# Patient Record
Sex: FEMALE | Race: BLACK OR AFRICAN AMERICAN | NOT HISPANIC OR LATINO | URBAN - METROPOLITAN AREA
[De-identification: names, ages, dates, MRNs, and addresses within clinical notes are randomized per-mention and may not be internally consistent; named-entity substitution may affect disease eponyms.]

---

## 2021-09-16 ENCOUNTER — OUTPATIENT (OUTPATIENT)
Dept: OUTPATIENT SERVICES | Facility: HOSPITAL | Age: 86
LOS: 1 days | End: 2021-09-16
Payer: MEDICARE

## 2021-09-16 ENCOUNTER — APPOINTMENT (OUTPATIENT)
Dept: CT IMAGING | Facility: IMAGING CENTER | Age: 86
End: 2021-09-16
Payer: MEDICARE

## 2021-09-16 DIAGNOSIS — R68.81 EARLY SATIETY: ICD-10-CM

## 2021-09-16 DIAGNOSIS — R63.4 ABNORMAL WEIGHT LOSS: ICD-10-CM

## 2021-09-16 PROCEDURE — 74177 CT ABD & PELVIS W/CONTRAST: CPT | Mod: 26,MH

## 2021-09-16 PROCEDURE — 74177 CT ABD & PELVIS W/CONTRAST: CPT | Mod: MH

## 2021-09-16 PROCEDURE — 82565 ASSAY OF CREATININE: CPT

## 2021-09-25 PROBLEM — Z00.00 ENCOUNTER FOR PREVENTIVE HEALTH EXAMINATION: Status: ACTIVE | Noted: 2021-09-25

## 2021-11-02 ENCOUNTER — OUTPATIENT (OUTPATIENT)
Dept: OUTPATIENT SERVICES | Facility: HOSPITAL | Age: 86
LOS: 1 days | End: 2021-11-02
Payer: MEDICARE

## 2021-11-02 ENCOUNTER — APPOINTMENT (OUTPATIENT)
Dept: CT IMAGING | Facility: IMAGING CENTER | Age: 86
End: 2021-11-02
Payer: MEDICARE

## 2021-11-02 ENCOUNTER — RESULT REVIEW (OUTPATIENT)
Age: 86
End: 2021-11-02

## 2021-11-02 DIAGNOSIS — R68.81 EARLY SATIETY: ICD-10-CM

## 2021-11-02 DIAGNOSIS — Z00.8 ENCOUNTER FOR OTHER GENERAL EXAMINATION: ICD-10-CM

## 2021-11-02 DIAGNOSIS — R63.4 ABNORMAL WEIGHT LOSS: ICD-10-CM

## 2021-11-02 PROCEDURE — 71260 CT THORAX DX C+: CPT | Mod: 26,MH

## 2021-11-02 PROCEDURE — 71260 CT THORAX DX C+: CPT | Mod: MH

## 2021-11-02 PROCEDURE — 82565 ASSAY OF CREATININE: CPT

## 2022-01-04 ENCOUNTER — APPOINTMENT (OUTPATIENT)
Dept: ULTRASOUND IMAGING | Facility: IMAGING CENTER | Age: 87
End: 2022-01-04

## 2023-11-30 ENCOUNTER — INPATIENT (INPATIENT)
Facility: HOSPITAL | Age: 88
LOS: 5 days | Discharge: HOME | End: 2023-12-06
Attending: HOSPITALIST | Admitting: HOSPITALIST
Payer: MEDICARE

## 2023-11-30 VITALS
WEIGHT: 119.93 LBS | HEART RATE: 109 BPM | TEMPERATURE: 103 F | SYSTOLIC BLOOD PRESSURE: 127 MMHG | OXYGEN SATURATION: 96 % | HEIGHT: 64 IN | RESPIRATION RATE: 20 BRPM | DIASTOLIC BLOOD PRESSURE: 76 MMHG

## 2023-11-30 DIAGNOSIS — J45.909 UNSPECIFIED ASTHMA, UNCOMPLICATED: ICD-10-CM

## 2023-11-30 DIAGNOSIS — E87.6 HYPOKALEMIA: ICD-10-CM

## 2023-11-30 DIAGNOSIS — J18.9 PNEUMONIA, UNSPECIFIED ORGANISM: ICD-10-CM

## 2023-11-30 DIAGNOSIS — D64.9 ANEMIA, UNSPECIFIED: ICD-10-CM

## 2023-11-30 DIAGNOSIS — I77.1 STRICTURE OF ARTERY: ICD-10-CM

## 2023-11-30 DIAGNOSIS — E87.20 ACIDOSIS, UNSPECIFIED: ICD-10-CM

## 2023-11-30 DIAGNOSIS — I10 ESSENTIAL (PRIMARY) HYPERTENSION: ICD-10-CM

## 2023-11-30 LAB
ALBUMIN SERPL ELPH-MCNC: 2.5 G/DL — LOW (ref 3.3–5)
ALBUMIN SERPL ELPH-MCNC: 2.5 G/DL — LOW (ref 3.3–5)
ALP SERPL-CCNC: 105 U/L — SIGNIFICANT CHANGE UP (ref 40–120)
ALP SERPL-CCNC: 105 U/L — SIGNIFICANT CHANGE UP (ref 40–120)
ALT FLD-CCNC: 53 U/L — SIGNIFICANT CHANGE UP (ref 12–78)
ALT FLD-CCNC: 53 U/L — SIGNIFICANT CHANGE UP (ref 12–78)
ANION GAP SERPL CALC-SCNC: 9 MMOL/L — SIGNIFICANT CHANGE UP (ref 5–17)
ANION GAP SERPL CALC-SCNC: 9 MMOL/L — SIGNIFICANT CHANGE UP (ref 5–17)
APPEARANCE UR: ABNORMAL
APPEARANCE UR: ABNORMAL
APTT BLD: 28.7 SEC — SIGNIFICANT CHANGE UP (ref 24.5–35.6)
APTT BLD: 28.7 SEC — SIGNIFICANT CHANGE UP (ref 24.5–35.6)
AST SERPL-CCNC: 42 U/L — HIGH (ref 15–37)
AST SERPL-CCNC: 42 U/L — HIGH (ref 15–37)
BACTERIA # UR AUTO: ABNORMAL /HPF
BACTERIA # UR AUTO: ABNORMAL /HPF
BASOPHILS # BLD AUTO: 0 K/UL — SIGNIFICANT CHANGE UP (ref 0–0.2)
BASOPHILS # BLD AUTO: 0 K/UL — SIGNIFICANT CHANGE UP (ref 0–0.2)
BASOPHILS NFR BLD AUTO: 0 % — SIGNIFICANT CHANGE UP (ref 0–2)
BASOPHILS NFR BLD AUTO: 0 % — SIGNIFICANT CHANGE UP (ref 0–2)
BILIRUB SERPL-MCNC: 0.8 MG/DL — SIGNIFICANT CHANGE UP (ref 0.2–1.2)
BILIRUB SERPL-MCNC: 0.8 MG/DL — SIGNIFICANT CHANGE UP (ref 0.2–1.2)
BILIRUB UR-MCNC: ABNORMAL
BILIRUB UR-MCNC: ABNORMAL
BUN SERPL-MCNC: 26 MG/DL — HIGH (ref 7–23)
BUN SERPL-MCNC: 26 MG/DL — HIGH (ref 7–23)
CALCIUM SERPL-MCNC: 8.6 MG/DL — SIGNIFICANT CHANGE UP (ref 8.5–10.1)
CALCIUM SERPL-MCNC: 8.6 MG/DL — SIGNIFICANT CHANGE UP (ref 8.5–10.1)
CHLORIDE SERPL-SCNC: 108 MMOL/L — SIGNIFICANT CHANGE UP (ref 96–108)
CHLORIDE SERPL-SCNC: 108 MMOL/L — SIGNIFICANT CHANGE UP (ref 96–108)
CO2 SERPL-SCNC: 22 MMOL/L — SIGNIFICANT CHANGE UP (ref 22–31)
CO2 SERPL-SCNC: 22 MMOL/L — SIGNIFICANT CHANGE UP (ref 22–31)
COLOR SPEC: SIGNIFICANT CHANGE UP
COLOR SPEC: SIGNIFICANT CHANGE UP
CREAT SERPL-MCNC: 1.24 MG/DL — SIGNIFICANT CHANGE UP (ref 0.5–1.3)
CREAT SERPL-MCNC: 1.24 MG/DL — SIGNIFICANT CHANGE UP (ref 0.5–1.3)
DIFF PNL FLD: NEGATIVE — SIGNIFICANT CHANGE UP
DIFF PNL FLD: NEGATIVE — SIGNIFICANT CHANGE UP
EGFR: 42 ML/MIN/1.73M2 — LOW
EGFR: 42 ML/MIN/1.73M2 — LOW
EOSINOPHIL # BLD AUTO: 0 K/UL — SIGNIFICANT CHANGE UP (ref 0–0.5)
EOSINOPHIL # BLD AUTO: 0 K/UL — SIGNIFICANT CHANGE UP (ref 0–0.5)
EOSINOPHIL NFR BLD AUTO: 0 % — SIGNIFICANT CHANGE UP (ref 0–6)
EOSINOPHIL NFR BLD AUTO: 0 % — SIGNIFICANT CHANGE UP (ref 0–6)
EPI CELLS # UR: SIGNIFICANT CHANGE UP
EPI CELLS # UR: SIGNIFICANT CHANGE UP
GLUCOSE SERPL-MCNC: 149 MG/DL — HIGH (ref 70–99)
GLUCOSE SERPL-MCNC: 149 MG/DL — HIGH (ref 70–99)
GLUCOSE UR QL: NEGATIVE MG/DL — SIGNIFICANT CHANGE UP
GLUCOSE UR QL: NEGATIVE MG/DL — SIGNIFICANT CHANGE UP
HCT VFR BLD CALC: 35.2 % — SIGNIFICANT CHANGE UP (ref 34.5–45)
HCT VFR BLD CALC: 35.2 % — SIGNIFICANT CHANGE UP (ref 34.5–45)
HGB BLD-MCNC: 11.4 G/DL — LOW (ref 11.5–15.5)
HGB BLD-MCNC: 11.4 G/DL — LOW (ref 11.5–15.5)
INR BLD: 1.16 RATIO — SIGNIFICANT CHANGE UP (ref 0.85–1.18)
INR BLD: 1.16 RATIO — SIGNIFICANT CHANGE UP (ref 0.85–1.18)
KETONES UR-MCNC: ABNORMAL MG/DL
KETONES UR-MCNC: ABNORMAL MG/DL
LACTATE SERPL-SCNC: 2 MMOL/L — SIGNIFICANT CHANGE UP (ref 0.7–2)
LACTATE SERPL-SCNC: 2 MMOL/L — SIGNIFICANT CHANGE UP (ref 0.7–2)
LACTATE SERPL-SCNC: 3.3 MMOL/L — HIGH (ref 0.7–2)
LACTATE SERPL-SCNC: 3.3 MMOL/L — HIGH (ref 0.7–2)
LEUKOCYTE ESTERASE UR-ACNC: NEGATIVE — SIGNIFICANT CHANGE UP
LEUKOCYTE ESTERASE UR-ACNC: NEGATIVE — SIGNIFICANT CHANGE UP
LYMPHOCYTES # BLD AUTO: 0.33 K/UL — LOW (ref 1–3.3)
LYMPHOCYTES # BLD AUTO: 0.33 K/UL — LOW (ref 1–3.3)
LYMPHOCYTES # BLD AUTO: 2 % — LOW (ref 13–44)
LYMPHOCYTES # BLD AUTO: 2 % — LOW (ref 13–44)
MANUAL SMEAR VERIFICATION: SIGNIFICANT CHANGE UP
MANUAL SMEAR VERIFICATION: SIGNIFICANT CHANGE UP
MCHC RBC-ENTMCNC: 27.1 PG — SIGNIFICANT CHANGE UP (ref 27–34)
MCHC RBC-ENTMCNC: 27.1 PG — SIGNIFICANT CHANGE UP (ref 27–34)
MCHC RBC-ENTMCNC: 32.4 G/DL — SIGNIFICANT CHANGE UP (ref 32–36)
MCHC RBC-ENTMCNC: 32.4 G/DL — SIGNIFICANT CHANGE UP (ref 32–36)
MCV RBC AUTO: 83.6 FL — SIGNIFICANT CHANGE UP (ref 80–100)
MCV RBC AUTO: 83.6 FL — SIGNIFICANT CHANGE UP (ref 80–100)
MONOCYTES # BLD AUTO: 0.16 K/UL — SIGNIFICANT CHANGE UP (ref 0–0.9)
MONOCYTES # BLD AUTO: 0.16 K/UL — SIGNIFICANT CHANGE UP (ref 0–0.9)
MONOCYTES NFR BLD AUTO: 1 % — LOW (ref 2–14)
MONOCYTES NFR BLD AUTO: 1 % — LOW (ref 2–14)
NEUTROPHILS # BLD AUTO: 15.85 K/UL — HIGH (ref 1.8–7.4)
NEUTROPHILS # BLD AUTO: 15.85 K/UL — HIGH (ref 1.8–7.4)
NEUTROPHILS NFR BLD AUTO: 94 % — HIGH (ref 43–77)
NEUTROPHILS NFR BLD AUTO: 94 % — HIGH (ref 43–77)
NEUTS BAND # BLD: 3 % — SIGNIFICANT CHANGE UP (ref 0–8)
NEUTS BAND # BLD: 3 % — SIGNIFICANT CHANGE UP (ref 0–8)
NITRITE UR-MCNC: NEGATIVE — SIGNIFICANT CHANGE UP
NITRITE UR-MCNC: NEGATIVE — SIGNIFICANT CHANGE UP
NRBC # BLD: 0 /100 — SIGNIFICANT CHANGE UP (ref 0–0)
NRBC # BLD: 0 /100 — SIGNIFICANT CHANGE UP (ref 0–0)
NRBC # BLD: SIGNIFICANT CHANGE UP /100 WBCS (ref 0–0)
NRBC # BLD: SIGNIFICANT CHANGE UP /100 WBCS (ref 0–0)
PH UR: 5.5 — SIGNIFICANT CHANGE UP (ref 5–8)
PH UR: 5.5 — SIGNIFICANT CHANGE UP (ref 5–8)
PLAT MORPH BLD: NORMAL — SIGNIFICANT CHANGE UP
PLAT MORPH BLD: NORMAL — SIGNIFICANT CHANGE UP
PLATELET # BLD AUTO: 202 K/UL — SIGNIFICANT CHANGE UP (ref 150–400)
PLATELET # BLD AUTO: 202 K/UL — SIGNIFICANT CHANGE UP (ref 150–400)
POTASSIUM SERPL-MCNC: 3.4 MMOL/L — LOW (ref 3.5–5.3)
POTASSIUM SERPL-MCNC: 3.4 MMOL/L — LOW (ref 3.5–5.3)
POTASSIUM SERPL-SCNC: 3.4 MMOL/L — LOW (ref 3.5–5.3)
POTASSIUM SERPL-SCNC: 3.4 MMOL/L — LOW (ref 3.5–5.3)
PROT SERPL-MCNC: 7.5 GM/DL — SIGNIFICANT CHANGE UP (ref 6–8.3)
PROT SERPL-MCNC: 7.5 GM/DL — SIGNIFICANT CHANGE UP (ref 6–8.3)
PROT UR-MCNC: 100 MG/DL
PROT UR-MCNC: 100 MG/DL
PROTHROM AB SERPL-ACNC: 13.8 SEC — HIGH (ref 9.5–13)
PROTHROM AB SERPL-ACNC: 13.8 SEC — HIGH (ref 9.5–13)
RAPID RVP RESULT: SIGNIFICANT CHANGE UP
RAPID RVP RESULT: SIGNIFICANT CHANGE UP
RBC # BLD: 4.21 M/UL — SIGNIFICANT CHANGE UP (ref 3.8–5.2)
RBC # BLD: 4.21 M/UL — SIGNIFICANT CHANGE UP (ref 3.8–5.2)
RBC # FLD: 16.3 % — HIGH (ref 10.3–14.5)
RBC # FLD: 16.3 % — HIGH (ref 10.3–14.5)
RBC BLD AUTO: NORMAL — SIGNIFICANT CHANGE UP
RBC BLD AUTO: NORMAL — SIGNIFICANT CHANGE UP
RBC CASTS # UR COMP ASSIST: NEGATIVE /HPF — SIGNIFICANT CHANGE UP (ref 0–4)
RBC CASTS # UR COMP ASSIST: NEGATIVE /HPF — SIGNIFICANT CHANGE UP (ref 0–4)
SARS-COV-2 RNA SPEC QL NAA+PROBE: SIGNIFICANT CHANGE UP
SARS-COV-2 RNA SPEC QL NAA+PROBE: SIGNIFICANT CHANGE UP
SODIUM SERPL-SCNC: 139 MMOL/L — SIGNIFICANT CHANGE UP (ref 135–145)
SODIUM SERPL-SCNC: 139 MMOL/L — SIGNIFICANT CHANGE UP (ref 135–145)
SP GR SPEC: 1.03 — SIGNIFICANT CHANGE UP (ref 1–1.03)
SP GR SPEC: 1.03 — SIGNIFICANT CHANGE UP (ref 1–1.03)
UROBILINOGEN FLD QL: 1 MG/DL — SIGNIFICANT CHANGE UP (ref 0.2–1)
UROBILINOGEN FLD QL: 1 MG/DL — SIGNIFICANT CHANGE UP (ref 0.2–1)
WBC # BLD: 16.34 K/UL — HIGH (ref 3.8–10.5)
WBC # BLD: 16.34 K/UL — HIGH (ref 3.8–10.5)
WBC # FLD AUTO: 16.34 K/UL — HIGH (ref 3.8–10.5)
WBC # FLD AUTO: 16.34 K/UL — HIGH (ref 3.8–10.5)
WBC UR QL: SIGNIFICANT CHANGE UP /HPF (ref 0–5)
WBC UR QL: SIGNIFICANT CHANGE UP /HPF (ref 0–5)

## 2023-11-30 PROCEDURE — 99223 1ST HOSP IP/OBS HIGH 75: CPT

## 2023-11-30 PROCEDURE — 71045 X-RAY EXAM CHEST 1 VIEW: CPT | Mod: 26

## 2023-11-30 PROCEDURE — 99285 EMERGENCY DEPT VISIT HI MDM: CPT

## 2023-11-30 PROCEDURE — 93010 ELECTROCARDIOGRAM REPORT: CPT

## 2023-11-30 RX ORDER — ACETAMINOPHEN 500 MG
1000 TABLET ORAL ONCE
Refills: 0 | Status: COMPLETED | OUTPATIENT
Start: 2023-11-30 | End: 2023-11-30

## 2023-11-30 RX ORDER — CEFTRIAXONE 500 MG/1
1000 INJECTION, POWDER, FOR SOLUTION INTRAMUSCULAR; INTRAVENOUS EVERY 24 HOURS
Refills: 0 | Status: DISCONTINUED | OUTPATIENT
Start: 2023-12-01 | End: 2023-12-02

## 2023-11-30 RX ORDER — BUDESONIDE AND FORMOTEROL FUMARATE DIHYDRATE 160; 4.5 UG/1; UG/1
2 AEROSOL RESPIRATORY (INHALATION)
Refills: 0 | Status: DISCONTINUED | OUTPATIENT
Start: 2023-11-30 | End: 2023-12-06

## 2023-11-30 RX ORDER — POTASSIUM CHLORIDE 20 MEQ
40 PACKET (EA) ORAL ONCE
Refills: 0 | Status: COMPLETED | OUTPATIENT
Start: 2023-11-30 | End: 2023-11-30

## 2023-11-30 RX ORDER — AMLODIPINE BESYLATE 2.5 MG/1
10 TABLET ORAL DAILY
Refills: 0 | Status: DISCONTINUED | OUTPATIENT
Start: 2023-11-30 | End: 2023-12-06

## 2023-11-30 RX ORDER — ACETAMINOPHEN 500 MG
650 TABLET ORAL EVERY 6 HOURS
Refills: 0 | Status: DISCONTINUED | OUTPATIENT
Start: 2023-11-30 | End: 2023-12-06

## 2023-11-30 RX ORDER — AZITHROMYCIN 500 MG/1
500 TABLET, FILM COATED ORAL ONCE
Refills: 0 | Status: COMPLETED | OUTPATIENT
Start: 2023-11-30 | End: 2023-11-30

## 2023-11-30 RX ORDER — CEFTRIAXONE 500 MG/1
1000 INJECTION, POWDER, FOR SOLUTION INTRAMUSCULAR; INTRAVENOUS ONCE
Refills: 0 | Status: COMPLETED | OUTPATIENT
Start: 2023-11-30 | End: 2023-11-30

## 2023-11-30 RX ORDER — SODIUM CHLORIDE 9 MG/ML
1650 INJECTION INTRAMUSCULAR; INTRAVENOUS; SUBCUTANEOUS ONCE
Refills: 0 | Status: COMPLETED | OUTPATIENT
Start: 2023-11-30 | End: 2023-11-30

## 2023-11-30 RX ORDER — ONDANSETRON 8 MG/1
4 TABLET, FILM COATED ORAL EVERY 8 HOURS
Refills: 0 | Status: DISCONTINUED | OUTPATIENT
Start: 2023-11-30 | End: 2023-12-06

## 2023-11-30 RX ORDER — IBUPROFEN 200 MG
600 TABLET ORAL ONCE
Refills: 0 | Status: COMPLETED | OUTPATIENT
Start: 2023-11-30 | End: 2023-11-30

## 2023-11-30 RX ORDER — LANOLIN ALCOHOL/MO/W.PET/CERES
3 CREAM (GRAM) TOPICAL AT BEDTIME
Refills: 0 | Status: DISCONTINUED | OUTPATIENT
Start: 2023-11-30 | End: 2023-12-06

## 2023-11-30 RX ORDER — AZITHROMYCIN 500 MG/1
500 TABLET, FILM COATED ORAL DAILY
Refills: 0 | Status: DISCONTINUED | OUTPATIENT
Start: 2023-12-01 | End: 2023-12-02

## 2023-11-30 RX ADMIN — SODIUM CHLORIDE 825 MILLILITER(S): 9 INJECTION INTRAMUSCULAR; INTRAVENOUS; SUBCUTANEOUS at 12:39

## 2023-11-30 RX ADMIN — Medication 40 MILLIEQUIVALENT(S): at 19:46

## 2023-11-30 RX ADMIN — AZITHROMYCIN 500 MILLIGRAM(S): 500 TABLET, FILM COATED ORAL at 17:46

## 2023-11-30 RX ADMIN — AZITHROMYCIN 255 MILLIGRAM(S): 500 TABLET, FILM COATED ORAL at 16:20

## 2023-11-30 RX ADMIN — CEFTRIAXONE 100 MILLIGRAM(S): 500 INJECTION, POWDER, FOR SOLUTION INTRAMUSCULAR; INTRAVENOUS at 16:22

## 2023-11-30 RX ADMIN — AMLODIPINE BESYLATE 10 MILLIGRAM(S): 2.5 TABLET ORAL at 18:52

## 2023-11-30 RX ADMIN — SODIUM CHLORIDE 1650 MILLILITER(S): 9 INJECTION INTRAMUSCULAR; INTRAVENOUS; SUBCUTANEOUS at 14:25

## 2023-11-30 RX ADMIN — Medication 1000 MILLIGRAM(S): at 13:15

## 2023-11-30 RX ADMIN — Medication 1000 MILLIGRAM(S): at 16:11

## 2023-11-30 RX ADMIN — Medication 100 MILLIGRAM(S): at 20:34

## 2023-11-30 RX ADMIN — Medication 600 MILLIGRAM(S): at 17:19

## 2023-11-30 RX ADMIN — Medication 400 MILLIGRAM(S): at 12:46

## 2023-11-30 RX ADMIN — CEFTRIAXONE 1000 MILLIGRAM(S): 500 INJECTION, POWDER, FOR SOLUTION INTRAMUSCULAR; INTRAVENOUS at 17:46

## 2023-11-30 RX ADMIN — Medication 600 MILLIGRAM(S): at 16:27

## 2023-11-30 NOTE — ED ADULT NURSE NOTE - OBJECTIVE STATEMENT
88 year old female with h/o HTN and lung mass presents today accompanied with her  c/o three days of productive cough, generalized weakness and dyspnea, pt denies sick contacts or recent travel, (-) chest pain (-) nausea or vomiting (-) diarrhea

## 2023-11-30 NOTE — PATIENT PROFILE ADULT - NSPROHMSYMPCOND_GEN_A_NUR
[address] [facility] Adult  Hospitalist Group                                                                                          Hospitalist Progress Note  Rian Gilford, MD  Answering service: [phone] from in house phone        Date of Service:  3/6/2023  NAME:  Socrates Ridley  :  1954  MRN:  [id number]       Admission Summary:   Socrates Ridley is a 76 y.o. female who presents with medical hx of recent fall, hypertension, type II dm, CAD, s/p PCI, CHF, CKD on PD exchange, COPD, GERD was admitted to the hospital on 3/3 with RLE pain and swelling. Pt fell on 3/1, hit her head but no LOC. Pt was able get back up after the fall, was able to move around initially. Pt did not seek for medical after immediately after the fall. Pt decided to seek for medical help when her RLE pain continue to get worse. Interval history / Subjective:   Patient seen and examined earlier this morning by me for follow-up of right lower extremity injury and concern for peritonitis. Patient has no new complaints at the time of my exam.  Knee swelling improved. Still limited range of motion. Assessment & Plan:     RLE pain  ? Cellulitis  S/p fall  - afebrile, wbc normal    BC (3/3) negative so far  Patient's right lower extremity is erythematous and very warm. Hard to differentiate cellulitis from just the hematoma in the injury. Pending MRI.   On empiric vancomycin and ceftriaxone  Orthopedic surgery has seen, no concern for compartment syndrome at this time  Patient found to have torn ACL and meniscus  Outpatient follow-up with orthopedic surgery     ESRD-PD with concern for SBP  - nephrology following;  PD fluid cell count looks like peritonitis  Continue ceftriaxone  Fluid cultures growing diphtheroids  Repeat cell count ordered  ID consult placed     Volume overloaded  - continue with UF with PD    Continue with lasix     Hypertension  - continue with hydralazine, coreg     Type II DM  - A1C 6.4    Continue with insulin therapy  Lantus 15 units daily     Depression  - continue with cympbalta     COPD  - not in exacerbation    On Dulera at home; continue with arformeterol/budesonide neb tmt here    Continue with Singulair    Hypokalemia  On Effer-K    Hypocalcemia  Better when corrected for albumin  We will hold off on IV calcium for now    Anemia, likely of chronic disease  Recheck H&H in the afternoon  Transfuse if hemoglobin less than 7     Code status: Full  Prophylaxis: Heparin  Care Plan discussed with: Patient, nurse  Anticipated Disposition: 48 hours  Inpatient  Cardiac monitoring: Telemetry     Hospital Problems  Date Reviewed: 11/28/2022            Codes Class Noted POA    Acute hypokalemia ICD-10-CM: E87.6  ICD-9-CM: 276.8  3/3/2023 Unknown        Right leg pain ICD-10-CM: M79.604  ICD-9-CM: 729.5  3/3/2023 Unknown        Peritoneal dialysis catheter in place [facility]) ICD-10-CM: Z99.2  ICD-9-CM: V45.11  3/3/2023 Unknown        Right leg swelling ICD-10-CM: M79.89  ICD-9-CM: 729.81  3/3/2023 Unknown         Social Determinants of Health     Tobacco Use: Medium Risk    Smoking Tobacco Use: Former    Smokeless Tobacco Use: Never    Passive Exposure: Not on file   Alcohol Use: Not on file   Financial Resource Strain: Not on file   Food Insecurity: Not on file   Transportation Needs: Not on file   Physical Activity: Not on file   Stress: Not on file   Social Connections: Not on file   Intimate Partner Violence: Not on file   Depression: Not on file   Housing Stability: Not on file       Review of Systems:   A comprehensive review of systems was negative except for that written in the HPI. Vital Signs:    Last 24hrs VS reviewed since prior progress note.  Most recent are:  Visit Vitals  /68   Pulse 82   Temp 98.2 °F (36.8 °C)   Resp 16   Wt 105.9 kg (233 lb 7.5 oz)   SpO2 100%   BMI 38.85 kg/m²         Intake/Output Summary (Last 24 hours) at 3/6/2023 1348  Last data filed at 3/6/2023 0148  Gross per 24 hour   Intake --   Output -963 ml   Net 963 ml          Physical Examination:     I had a face to face encounter with this patient and independently examined them on 3/6/2023 as outlined below:          General : alert x 3, awake, no acute distress,   HEENT: PEERL, EOMI, moist mucus membrane  Neck: supple, no JVD, no meningeal signs  Chest: Clear to auscultation bilaterally   CVS: S1 S2 heard, Capillary refill less than 2 seconds  Abd: soft/ non tender, non distended, BS physiological,   Ext: Right lower extremity at the knee and below is edematous, erythematous, and warm. Raised. Left lower extremity normal range of motion. Nonpitting edema right lower extremity. Left lower extremity no edema  Neuro/Psych: pleasant mood and affect, CN 2-12 grossly intact, sensory grossly within normal limit, Strength 5/5 in all extremities  Skin: warm     Data Review:    Review and/or order of clinical lab test  Review and/or order of tests in the radiology section of CPT  Review and/or order of tests in the medicine section of CPT      I have personally and independently reviewed all pertinent labs, diagnostic studies, imaging, and have provided independent interpretation of the same. Labs:     Recent Labs     03/06/23  0310 03/05/23  1435 03/05/23  0307   WBC 7.4  --  7.6   HGB 7.3* 7.3* 7.0*   HCT 24.0* 24.5* 23.8*     --  352       Recent Labs     03/06/23  0310 03/05/23  0307 03/04/23  0420    140 138   K 3.3* 3.1* 3.7   CL 94* 101 96*   CO2 34* 31 35*   BUN 22* 23* 27*   CREA 4.76* 4.36* 5.03*   * 138* 211*   CA 7.5* 6.2* 7.4*   MG  --  1.9 2.2   PHOS 4.4 4.1 4.2       Recent Labs     03/05/23  0307 03/04/23  0420 03/03/23  1644   ALB 1.3* 1.8* 1.6*       No results for input(s): INR, PTP, APTT, INREXT, INREXT in the last 72 hours.    Recent Labs     03/04/23  0420   FERR 906*        Lab Results   Component Value Date/Time    Folate 5.2 10/19/2022 06:33 PM        No results for input(s): PH, PCO2, PO2 in the last 72 hours. No results for input(s): CPK, CKNDX, TROIQ in the last 72 hours. No lab exists for component: CPKMB  Lab Results   Component Value Date/Time    Cholesterol, total 260 (H) 07/02/2018 04:27 AM    HDL Cholesterol 31 07/02/2018 04:27 AM    LDL, calculated 197.2 (H) 07/02/2018 04:27 AM    Triglyceride 159 (H) 07/02/2018 04:27 AM    CHOL/HDL Ratio 8.4 (H) 07/02/2018 04:27 AM     Lab Results   Component Value Date/Time    Glucose (POC) 129 (H) 03/06/2023 12:01 PM    Glucose (POC) 125 (H) 03/06/2023 07:02 AM    Glucose (POC) 244 (H) 03/05/2023 10:04 PM    Glucose (POC) 179 (H) 03/05/2023 05:22 PM    Glucose (POC) 151 (H) 03/05/2023 12:10 PM     Lab Results   Component Value Date/Time    Color YELLOW/STRAW 12/02/2021 11:00 PM    Appearance CLEAR 12/02/2021 11:00 PM    Specific gravity 1.025 12/02/2021 11:00 PM    Specific gravity 1.010 06/08/2011 12:00 AM    pH (UA) 5.5 12/02/2021 11:00 PM    Protein 300 (A) 12/02/2021 11:00 PM    Glucose 500 (A) 12/02/2021 11:00 PM    Ketone Negative 12/02/2021 11:00 PM    Bilirubin Negative 12/02/2021 11:00 PM    Urobilinogen 1.0 12/02/2021 11:00 PM    Nitrites Negative 12/02/2021 11:00 PM    Leukocyte Esterase Negative 12/02/2021 11:00 PM    Epithelial cells FEW 12/02/2021 11:00 PM    Bacteria Negative 12/02/2021 11:00 PM    WBC 0-4 12/02/2021 11:00 PM    RBC 0-5 12/02/2021 11:00 PM       Notes reviewed from all clinical/nonclinical/nursing services involved in patient's clinical care. Care coordination discussions were held with appropriate clinical/nonclinical/ nursing providers based on care coordination needs. Patients current active Medications were reviewed, considered, added and adjusted based on the clinical condition today. Home Medications were reconciled to the best of my ability given all available resources at the time of admission. Route is PO if not otherwise noted.       Admission Status:[id number]:::1}      Medications Reviewed:     Current Facility-Administered Medications   Medication Dose Route Frequency    piperacillin-tazobactam (ZOSYN) 3.375 g in 0.9% sodium chloride (MBP/ADV) 100 mL MBP  3.375 g IntraVENous Q12H    polyethylene glycol (MIRALAX) packet 17 g  17 g Oral BID    potassium bicarb-citric acid (EFFER-K) tablet 40 mEq  40 mEq Oral BID    peritoneal dialysis DEXTROSE 2.5% (2.5 mEq/L low calcium) solution 2,000 mL  2,000 mL IntraPERitoneal DAILY    gentamicin (GARAMYCIN) 0.1 % cream   Topical BID    senna-docusate (PERICOLACE) 8.6-50 mg per tablet 1 Tablet  1 Tablet Oral DAILY    peritoneal dialysis DEXTROSE 4.25% (2.5 mEq/L low calcium) 6,000 mL with heparin (porcine) 3,000 Units   IntraPERitoneal DAILY    vancomycin - pharmacy to dose    Other Rx Dosing/Monitoring    peritoneal dialysis DEXTROSE 2.5% (2.5 mEq/L low calcium) 6,000 mL with heparin (porcine) 3,000 Units   IntraPERitoneal DAILY    peritoneal dialysis DEXTROSE 2.5% (2.5 mEq/L low calcium) 6,000 mL with heparin (porcine) 3,000 Units   IntraPERitoneal DAILY    epoetin joni-epbx (RETACRIT) injection 20,000 Units  20,000 Units SubCUTAneous Q TUE, THU & SAT    magnesium oxide (MAG-OX) tablet 400 mg  400 mg Oral DAILY    morphine injection 2 mg  2 mg IntraVENous Q3H PRN    oxyCODONE IR (ROXICODONE) tablet 5 mg  5 mg Oral Q6H PRN    albuterol-ipratropium (DUO-NEB) 2.5 MG-0.5 MG/3 ML  3 mL Nebulization Q6H PRN    amLODIPine (NORVASC) tablet 10 mg  10 mg Oral DAILY    aspirin tablet 325 mg  325 mg Oral DAILY    carvediloL (COREG) tablet 25 mg  25 mg Oral BID WITH MEALS    diclofenac (VOLTAREN) 1 % topical gel 2 g  2 g Topical DAILY    DULoxetine (CYMBALTA) capsule 20 mg  20 mg Oral BID    hydrALAZINE (APRESOLINE) tablet 50 mg  50 mg Oral TID    arformoterol 15 mcg/budesonide 0.5 mg neb solution   Nebulization BID RT    montelukast (SINGULAIR) tablet 10 mg  10 mg Oral QHS    rosuvastatin (CRESTOR) tablet 10 mg  10 mg Oral DAILY    ondansetron (ZOFRAN ODT) tablet 4 mg  4 mg Oral Q8H PRN    acetaminophen (TYLENOL) tablet 650 mg  650 mg Oral Q6H PRN    glucose chewable tablet 16 g  4 Tablet Oral PRN    glucagon (GLUCAGEN) injection 1 mg  1 mg IntraMUSCular PRN    dextrose 10 % infusion 0-250 mL  0-250 mL IntraVENous PRN    heparin (porcine) injection 5,000 Units  5,000 Units SubCUTAneous Q12H    furosemide (LASIX) tablet 80 mg  80 mg Oral BID    insulin lispro (HUMALOG) injection   SubCUTAneous AC&HS    insulin glargine (LANTUS) injection 15 Units  15 Units SubCUTAneous DAILY    gentamicin (GARAMYCIN) 0.1 % cream   Topical DIALYSIS PRN     ______________________________________________________________________  EXPECTED LENGTH OF STAY: - - -  ACTUAL LENGTH OF STAY:          3                 Berkley Mckinley MD cardiovascular/respiratory

## 2023-11-30 NOTE — ED ADULT NURSE REASSESSMENT NOTE - NS ED NURSE REASSESS COMMENT FT1
Pt assisted on bedpan for urine specimen. Pt resting comfortably at this time. No s/s of pain noted.  at bedside. Rpt lactate sent and awaiting results.

## 2023-11-30 NOTE — H&P ADULT - NSHPLABSRESULTS_GEN_ALL_CORE
11.4   16.34 )-----------( 202      ( 2023 12:40 )             35.2       139  |  108  |  26<H>  ----------------------------<  149<H>  3.4<L>   |  22  |  1.24    Ca    8.6      2023 12:40    TPro  7.5  /  Alb  2.5<L>  /  TBili  0.8  /  DBili  x   /  AST  42<H>  /  ALT  53  /  AlkPhos  105      Urinalysis Basic - ( 2023 14:20 )    Color: Dark Yellow / Appearance: Cloudy / S.026 / pH: x  Gluc: x / Ketone: Trace mg/dL  / Bili: Small / Urobili: 1.0 mg/dL   Blood: x / Protein: 100 mg/dL / Nitrite: Negative   Leuk Esterase: Negative / RBC: Negative /HPF / WBC 0-2 /HPF   Sq Epi: x / Non Sq Epi: x / Bacteria: Few /HPF    Chest x-ray 23  IMPRESSION:  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 Limited size comparison of the aneurysm due to differing technique and patient position.    Increased reticular and patchy right mid and lower lung opacities, of indeterminate nature, but could be due to infection. A short-term follow-up chest x-ray could be obtained to evaluate for resolution.

## 2023-11-30 NOTE — H&P ADULT - NSHPPHYSICALEXAM_GEN_ALL_CORE
T(C): 36.7 (11-30-23 @ 18:44), Max: 39.5 (11-30-23 @ 14:31)  HR: 92 (11-30-23 @ 18:44) (92 - 118)  BP: 138/68 (11-30-23 @ 18:44) (127/76 - 146/82)  RR: 20 (11-30-23 @ 18:44) (17 - 27)  SpO2: 94% (11-30-23 @ 18:44) (94% - 98%)    CONSTITUTIONAL: Well groomed, no apparent distress  EYES: PERRLA and symmetric, EOMI  ENMT: Oral mucosa with moist membranes  RESP: No respiratory distress, tachypneic, diminished breath sounds on R.  CV: RRR  GI: Soft, NT, ND

## 2023-11-30 NOTE — ED PROVIDER NOTE - CLINICAL SUMMARY MEDICAL DECISION MAKING FREE TEXT BOX
88 year old female with h/o HTN and lung mass presents today accompanied with her  c/o three days of productive cough, generalized weakness and dyspnea, pt denies sick contacts or recent travel, (-) chest pain (-) nausea or vomiting (-) diarrhea, on exam pt is in no respiratory distress, able to speak in complete sentences, on oxygen due to hypoxia, lungs with crackles on the right side, skin is hot to touch otherwise normal, sepsis workup ordered, r/o pna, flu/covid, uti, will reassess and dispo 88 year old female with h/o HTN and lung mass presents today accompanied with her  c/o three days of productive cough, generalized weakness and dyspnea, pt denies sick contacts or recent travel, (-) chest pain (-) nausea or vomiting (-) diarrhea, on exam pt is in no respiratory distress, able to speak in complete sentences, on oxygen due to hypoxia, lungs with crackles on the right side, skin is hot to touch otherwise normal, sepsis workup ordered, r/o pna, flu/covid, uti, will reassess and dispo    labs reviewed, cxr positive for pna  iv antibiotics started, ivf, vitals reassessed  pt tba for treatment

## 2023-11-30 NOTE — ED ADULT NURSE NOTE - NSFALLHARMRISKINTERV_ED_ALL_ED
Assistance OOB with selected safe patient handling equipment if applicable/Assistance with ambulation/Communicate risk of Fall with Harm to all staff, patient, and family/Monitor gait and stability/Provide visual cue: red socks, yellow wristband, yellow gown, etc/Reinforce activity limits and safety measures with patient and family/Bed in lowest position, wheels locked, appropriate side rails in place/Call bell, personal items and telephone in reach/Instruct patient to call for assistance before getting out of bed/chair/stretcher/Non-slip footwear applied when patient is off stretcher/[place] to call system/Physically safe environment - no spills, clutter or unnecessary equipment/Purposeful Proactive Rounding/Room/bathroom lighting operational, light cord in reach

## 2023-11-30 NOTE — H&P ADULT - HISTORY OF PRESENT ILLNESS
Josue Claudio is an 88 year old female with PMHx of HTN and asthma who presented to the ED on 11/30/23 for complaints of shortness of breath and cough.    Patient reports she started having trouble breathing three days ago. Associated productive cough with dark yellow sputum. Took over-the-counter antitussives without improvement of symptoms. She thought it was her asthma flaring up so she took her Symbicort but that did not help either. Denies recent travel or sick contacts. No stand-alone radiators or mold at home. No pets, carpet, or new fragrances. Presented to the ER for further evaluation.    In the ED, Tmax 103.1, HR as elevated as 105, BP as elevated as 146/82, RR as elevated as 25. WBC 16.34K, hgb 11.4, potassium 3.4, BUN 26, lactic acid 3.3. U/A unremarkable. RVP negative. CXR with RML and RLL patchy infiltrates. Received NS 1650 cc bolus, ceftriaxone 1 g IV, azithromycin 500 mg IV, and acetaminophen 1 g IV. Repeat lactic acid 2.0 after IVF.

## 2023-11-30 NOTE — ED ADULT TRIAGE NOTE - CHIEF COMPLAINT QUOTE
BIBEMS c/o shortness of breath, feeling feverish, coughing x 3 days. hx: asthma, htn, tumor found in lung. as per ems 91% on RA, given 3L via NC and went to 96%

## 2023-11-30 NOTE — ED PROVIDER NOTE - HIV OFFER
Patient presents with upper right rib pain from falling outside.  Patient states that she also is having a hard time breathing.   Symptoms started: Monday 20th.  OTCs used: Tylenol    Patient would like communication of their results via:        Cell Phone:   Telephone Information:   Mobile [phone]     Okay to leave a message containing results? Yes      Writer was wearing gloves, level 3 procedure mask and N95 respirator during rooming process.       Opt out

## 2023-11-30 NOTE — PATIENT PROFILE ADULT - FALL HARM RISK - HARM RISK INTERVENTIONS

## 2023-11-30 NOTE — H&P ADULT - ASSESSMENT
Josue Claudio is an 88 year old female with PMHx of HTN and asthma who presented to the ED on 11/30/23 for complaints of shortness of breath and cough and admitted for sepsis secondary to PNA.    Sepsis secondary to PNA  Complaints of shortness of breath and productive cough x 3 days  Temp 103.1, , RR 25, WBC 16.34K on admission  CXR with RML and RLL patchy infiltrates  U/A unremarkable. 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 Plan of care discussed with  and son at bedside.

## 2023-12-01 LAB
FERRITIN SERPL-MCNC: 219 NG/ML — SIGNIFICANT CHANGE UP (ref 13–330)
FERRITIN SERPL-MCNC: 219 NG/ML — SIGNIFICANT CHANGE UP (ref 13–330)
FOLATE SERPL-MCNC: 13.9 NG/ML — SIGNIFICANT CHANGE UP
FOLATE SERPL-MCNC: 13.9 NG/ML — SIGNIFICANT CHANGE UP
HCT VFR BLD CALC: 32.5 % — LOW (ref 34.5–45)
HCT VFR BLD CALC: 32.5 % — LOW (ref 34.5–45)
HGB BLD-MCNC: 10.6 G/DL — LOW (ref 11.5–15.5)
HGB BLD-MCNC: 10.6 G/DL — LOW (ref 11.5–15.5)
IRON SATN MFR SERPL: 10 UG/DL — LOW (ref 30–160)
IRON SATN MFR SERPL: 10 UG/DL — LOW (ref 30–160)
IRON SATN MFR SERPL: 5 % — LOW (ref 14–50)
IRON SATN MFR SERPL: 5 % — LOW (ref 14–50)
MCHC RBC-ENTMCNC: 27.5 PG — SIGNIFICANT CHANGE UP (ref 27–34)
MCHC RBC-ENTMCNC: 27.5 PG — SIGNIFICANT CHANGE UP (ref 27–34)
MCHC RBC-ENTMCNC: 32.6 G/DL — SIGNIFICANT CHANGE UP (ref 32–36)
MCHC RBC-ENTMCNC: 32.6 G/DL — SIGNIFICANT CHANGE UP (ref 32–36)
MCV RBC AUTO: 84.2 FL — SIGNIFICANT CHANGE UP (ref 80–100)
MCV RBC AUTO: 84.2 FL — SIGNIFICANT CHANGE UP (ref 80–100)
NRBC # BLD: 0 /100 WBCS — SIGNIFICANT CHANGE UP (ref 0–0)
NRBC # BLD: 0 /100 WBCS — SIGNIFICANT CHANGE UP (ref 0–0)
PLATELET # BLD AUTO: 178 K/UL — SIGNIFICANT CHANGE UP (ref 150–400)
PLATELET # BLD AUTO: 178 K/UL — SIGNIFICANT CHANGE UP (ref 150–400)
PROCALCITONIN SERPL-MCNC: 6.72 NG/ML — HIGH (ref 0.02–0.1)
PROCALCITONIN SERPL-MCNC: 6.72 NG/ML — HIGH (ref 0.02–0.1)
RBC # BLD: 3.86 M/UL — SIGNIFICANT CHANGE UP (ref 3.8–5.2)
RBC # BLD: 3.86 M/UL — SIGNIFICANT CHANGE UP (ref 3.8–5.2)
RBC # FLD: 16.6 % — HIGH (ref 10.3–14.5)
RBC # FLD: 16.6 % — HIGH (ref 10.3–14.5)
TIBC SERPL-MCNC: 211 UG/DL — LOW (ref 220–430)
TIBC SERPL-MCNC: 211 UG/DL — LOW (ref 220–430)
UIBC SERPL-MCNC: 201 UG/DL — SIGNIFICANT CHANGE UP (ref 110–370)
UIBC SERPL-MCNC: 201 UG/DL — SIGNIFICANT CHANGE UP (ref 110–370)
VIT B12 SERPL-MCNC: >2000 PG/ML — HIGH (ref 232–1245)
VIT B12 SERPL-MCNC: >2000 PG/ML — HIGH (ref 232–1245)
WBC # BLD: 16.41 K/UL — HIGH (ref 3.8–10.5)
WBC # BLD: 16.41 K/UL — HIGH (ref 3.8–10.5)
WBC # FLD AUTO: 16.41 K/UL — HIGH (ref 3.8–10.5)
WBC # FLD AUTO: 16.41 K/UL — HIGH (ref 3.8–10.5)

## 2023-12-01 PROCEDURE — 99232 SBSQ HOSP IP/OBS MODERATE 35: CPT

## 2023-12-01 PROCEDURE — 99497 ADVNCD CARE PLAN 30 MIN: CPT

## 2023-12-01 RX ADMIN — AMLODIPINE BESYLATE 10 MILLIGRAM(S): 2.5 TABLET ORAL at 06:49

## 2023-12-01 RX ADMIN — Medication 100 MILLIGRAM(S): at 06:49

## 2023-12-01 RX ADMIN — AZITHROMYCIN 500 MILLIGRAM(S): 500 TABLET, FILM COATED ORAL at 11:49

## 2023-12-01 RX ADMIN — Medication 100 MILLIGRAM(S): at 22:06

## 2023-12-01 RX ADMIN — CEFTRIAXONE 100 MILLIGRAM(S): 500 INJECTION, POWDER, FOR SOLUTION INTRAMUSCULAR; INTRAVENOUS at 17:32

## 2023-12-01 RX ADMIN — BUDESONIDE AND FORMOTEROL FUMARATE DIHYDRATE 2 PUFF(S): 160; 4.5 AEROSOL RESPIRATORY (INHALATION) at 17:31

## 2023-12-01 RX ADMIN — Medication 100 MILLIGRAM(S): at 13:45

## 2023-12-01 RX ADMIN — BUDESONIDE AND FORMOTEROL FUMARATE DIHYDRATE 2 PUFF(S): 160; 4.5 AEROSOL RESPIRATORY (INHALATION) at 06:54

## 2023-12-01 NOTE — PROGRESS NOTE ADULT - CONVERSATION DETAILS
Discussed about code status, tube feeding, iv antibiotics and HD.   Patient would like to be DNR/DNI   MOLST form signed.

## 2023-12-02 LAB
ALBUMIN SERPL ELPH-MCNC: 1.9 G/DL — LOW (ref 3.3–5)
ALBUMIN SERPL ELPH-MCNC: 1.9 G/DL — LOW (ref 3.3–5)
ALP SERPL-CCNC: 92 U/L — SIGNIFICANT CHANGE UP (ref 40–120)
ALP SERPL-CCNC: 92 U/L — SIGNIFICANT CHANGE UP (ref 40–120)
ALT FLD-CCNC: 28 U/L — SIGNIFICANT CHANGE UP (ref 12–78)
ALT FLD-CCNC: 28 U/L — SIGNIFICANT CHANGE UP (ref 12–78)
ANION GAP SERPL CALC-SCNC: 7 MMOL/L — SIGNIFICANT CHANGE UP (ref 5–17)
ANION GAP SERPL CALC-SCNC: 7 MMOL/L — SIGNIFICANT CHANGE UP (ref 5–17)
AST SERPL-CCNC: 19 U/L — SIGNIFICANT CHANGE UP (ref 15–37)
AST SERPL-CCNC: 19 U/L — SIGNIFICANT CHANGE UP (ref 15–37)
BILIRUB SERPL-MCNC: 0.5 MG/DL — SIGNIFICANT CHANGE UP (ref 0.2–1.2)
BILIRUB SERPL-MCNC: 0.5 MG/DL — SIGNIFICANT CHANGE UP (ref 0.2–1.2)
BUN SERPL-MCNC: 10 MG/DL — SIGNIFICANT CHANGE UP (ref 7–23)
BUN SERPL-MCNC: 10 MG/DL — SIGNIFICANT CHANGE UP (ref 7–23)
CALCIUM SERPL-MCNC: 7.9 MG/DL — LOW (ref 8.5–10.1)
CALCIUM SERPL-MCNC: 7.9 MG/DL — LOW (ref 8.5–10.1)
CHLORIDE SERPL-SCNC: 108 MMOL/L — SIGNIFICANT CHANGE UP (ref 96–108)
CHLORIDE SERPL-SCNC: 108 MMOL/L — SIGNIFICANT CHANGE UP (ref 96–108)
CO2 SERPL-SCNC: 25 MMOL/L — SIGNIFICANT CHANGE UP (ref 22–31)
CO2 SERPL-SCNC: 25 MMOL/L — SIGNIFICANT CHANGE UP (ref 22–31)
CREAT SERPL-MCNC: 0.65 MG/DL — SIGNIFICANT CHANGE UP (ref 0.5–1.3)
CREAT SERPL-MCNC: 0.65 MG/DL — SIGNIFICANT CHANGE UP (ref 0.5–1.3)
CULTURE RESULTS: SIGNIFICANT CHANGE UP
CULTURE RESULTS: SIGNIFICANT CHANGE UP
EGFR: 85 ML/MIN/1.73M2 — SIGNIFICANT CHANGE UP
EGFR: 85 ML/MIN/1.73M2 — SIGNIFICANT CHANGE UP
GLUCOSE SERPL-MCNC: 90 MG/DL — SIGNIFICANT CHANGE UP (ref 70–99)
GLUCOSE SERPL-MCNC: 90 MG/DL — SIGNIFICANT CHANGE UP (ref 70–99)
HCT VFR BLD CALC: 32.1 % — LOW (ref 34.5–45)
HCT VFR BLD CALC: 32.1 % — LOW (ref 34.5–45)
HGB BLD-MCNC: 10.3 G/DL — LOW (ref 11.5–15.5)
HGB BLD-MCNC: 10.3 G/DL — LOW (ref 11.5–15.5)
LEGIONELLA AG UR QL: NEGATIVE — SIGNIFICANT CHANGE UP
LEGIONELLA AG UR QL: NEGATIVE — SIGNIFICANT CHANGE UP
MCHC RBC-ENTMCNC: 26.8 PG — LOW (ref 27–34)
MCHC RBC-ENTMCNC: 26.8 PG — LOW (ref 27–34)
MCHC RBC-ENTMCNC: 32.1 G/DL — SIGNIFICANT CHANGE UP (ref 32–36)
MCHC RBC-ENTMCNC: 32.1 G/DL — SIGNIFICANT CHANGE UP (ref 32–36)
MCV RBC AUTO: 83.4 FL — SIGNIFICANT CHANGE UP (ref 80–100)
MCV RBC AUTO: 83.4 FL — SIGNIFICANT CHANGE UP (ref 80–100)
MRSA PCR RESULT.: SIGNIFICANT CHANGE UP
MRSA PCR RESULT.: SIGNIFICANT CHANGE UP
NRBC # BLD: 0 /100 WBCS — SIGNIFICANT CHANGE UP (ref 0–0)
NRBC # BLD: 0 /100 WBCS — SIGNIFICANT CHANGE UP (ref 0–0)
PLATELET # BLD AUTO: 245 K/UL — SIGNIFICANT CHANGE UP (ref 150–400)
PLATELET # BLD AUTO: 245 K/UL — SIGNIFICANT CHANGE UP (ref 150–400)
POTASSIUM SERPL-MCNC: 3.1 MMOL/L — LOW (ref 3.5–5.3)
POTASSIUM SERPL-MCNC: 3.1 MMOL/L — LOW (ref 3.5–5.3)
POTASSIUM SERPL-SCNC: 3.1 MMOL/L — LOW (ref 3.5–5.3)
POTASSIUM SERPL-SCNC: 3.1 MMOL/L — LOW (ref 3.5–5.3)
PROT SERPL-MCNC: 6.4 GM/DL — SIGNIFICANT CHANGE UP (ref 6–8.3)
PROT SERPL-MCNC: 6.4 GM/DL — SIGNIFICANT CHANGE UP (ref 6–8.3)
RBC # BLD: 3.85 M/UL — SIGNIFICANT CHANGE UP (ref 3.8–5.2)
RBC # BLD: 3.85 M/UL — SIGNIFICANT CHANGE UP (ref 3.8–5.2)
RBC # FLD: 16.4 % — HIGH (ref 10.3–14.5)
RBC # FLD: 16.4 % — HIGH (ref 10.3–14.5)
S AUREUS DNA NOSE QL NAA+PROBE: SIGNIFICANT CHANGE UP
S AUREUS DNA NOSE QL NAA+PROBE: SIGNIFICANT CHANGE UP
S PNEUM AG UR QL: NEGATIVE — SIGNIFICANT CHANGE UP
S PNEUM AG UR QL: NEGATIVE — SIGNIFICANT CHANGE UP
SODIUM SERPL-SCNC: 140 MMOL/L — SIGNIFICANT CHANGE UP (ref 135–145)
SODIUM SERPL-SCNC: 140 MMOL/L — SIGNIFICANT CHANGE UP (ref 135–145)
SPECIMEN SOURCE: SIGNIFICANT CHANGE UP
SPECIMEN SOURCE: SIGNIFICANT CHANGE UP
WBC # BLD: 13.08 K/UL — HIGH (ref 3.8–10.5)
WBC # BLD: 13.08 K/UL — HIGH (ref 3.8–10.5)
WBC # FLD AUTO: 13.08 K/UL — HIGH (ref 3.8–10.5)
WBC # FLD AUTO: 13.08 K/UL — HIGH (ref 3.8–10.5)

## 2023-12-02 PROCEDURE — 99233 SBSQ HOSP IP/OBS HIGH 50: CPT

## 2023-12-02 PROCEDURE — 71275 CT ANGIOGRAPHY CHEST: CPT | Mod: 26

## 2023-12-02 RX ORDER — IPRATROPIUM/ALBUTEROL SULFATE 18-103MCG
3 AEROSOL WITH ADAPTER (GRAM) INHALATION EVERY 6 HOURS
Refills: 0 | Status: DISCONTINUED | OUTPATIENT
Start: 2023-12-02 | End: 2023-12-03

## 2023-12-02 RX ORDER — PIPERACILLIN AND TAZOBACTAM 4; .5 G/20ML; G/20ML
3.38 INJECTION, POWDER, LYOPHILIZED, FOR SOLUTION INTRAVENOUS ONCE
Refills: 0 | Status: COMPLETED | OUTPATIENT
Start: 2023-12-03 | End: 2023-12-03

## 2023-12-02 RX ORDER — PIPERACILLIN AND TAZOBACTAM 4; .5 G/20ML; G/20ML
3.38 INJECTION, POWDER, LYOPHILIZED, FOR SOLUTION INTRAVENOUS ONCE
Refills: 0 | Status: COMPLETED | OUTPATIENT
Start: 2023-12-02 | End: 2023-12-02

## 2023-12-02 RX ORDER — ALBUTEROL 90 UG/1
AEROSOL, METERED ORAL
Refills: 0 | Status: DISCONTINUED | OUTPATIENT
Start: 2023-12-02 | End: 2023-12-02

## 2023-12-02 RX ORDER — PIPERACILLIN AND TAZOBACTAM 4; .5 G/20ML; G/20ML
3.38 INJECTION, POWDER, LYOPHILIZED, FOR SOLUTION INTRAVENOUS EVERY 8 HOURS
Refills: 0 | Status: DISCONTINUED | OUTPATIENT
Start: 2023-12-03 | End: 2023-12-06

## 2023-12-02 RX ORDER — SODIUM CHLORIDE 9 MG/ML
1000 INJECTION INTRAMUSCULAR; INTRAVENOUS; SUBCUTANEOUS
Refills: 0 | Status: DISCONTINUED | OUTPATIENT
Start: 2023-12-02 | End: 2023-12-06

## 2023-12-02 RX ORDER — POTASSIUM CHLORIDE 20 MEQ
40 PACKET (EA) ORAL EVERY 4 HOURS
Refills: 0 | Status: COMPLETED | OUTPATIENT
Start: 2023-12-02 | End: 2023-12-02

## 2023-12-02 RX ADMIN — Medication 3 MILLILITER(S): at 18:27

## 2023-12-02 RX ADMIN — Medication 100 MILLIGRAM(S): at 21:51

## 2023-12-02 RX ADMIN — Medication 40 MILLIEQUIVALENT(S): at 11:44

## 2023-12-02 RX ADMIN — AZITHROMYCIN 500 MILLIGRAM(S): 500 TABLET, FILM COATED ORAL at 11:44

## 2023-12-02 RX ADMIN — Medication 650 MILLIGRAM(S): at 12:10

## 2023-12-02 RX ADMIN — PIPERACILLIN AND TAZOBACTAM 25 GRAM(S): 4; .5 INJECTION, POWDER, LYOPHILIZED, FOR SOLUTION INTRAVENOUS at 19:03

## 2023-12-02 RX ADMIN — Medication 650 MILLIGRAM(S): at 13:10

## 2023-12-02 RX ADMIN — BUDESONIDE AND FORMOTEROL FUMARATE DIHYDRATE 2 PUFF(S): 160; 4.5 AEROSOL RESPIRATORY (INHALATION) at 05:39

## 2023-12-02 RX ADMIN — Medication 100 MILLIGRAM(S): at 05:40

## 2023-12-02 RX ADMIN — PIPERACILLIN AND TAZOBACTAM 200 GRAM(S): 4; .5 INJECTION, POWDER, LYOPHILIZED, FOR SOLUTION INTRAVENOUS at 14:31

## 2023-12-02 RX ADMIN — Medication 40 MILLIEQUIVALENT(S): at 14:31

## 2023-12-02 RX ADMIN — AMLODIPINE BESYLATE 10 MILLIGRAM(S): 2.5 TABLET ORAL at 05:40

## 2023-12-02 RX ADMIN — SODIUM CHLORIDE 50 MILLILITER(S): 9 INJECTION INTRAMUSCULAR; INTRAVENOUS; SUBCUTANEOUS at 14:33

## 2023-12-02 NOTE — DIETITIAN NUTRITION RISK NOTIFICATION - TREATMENT: THE FOLLOWING DIET HAS BEEN RECOMMENDED
Diet, Regular:   Supplement Feeding Modality:  Oral  Ensure Plus High Protein Cans or Servings Per Day:  1       Frequency:  Two Times a day (12-02-23 @ 14:54) [Pending Verification By Attending]  Diet, Regular (11-30-23 @ 17:43) [Active]

## 2023-12-02 NOTE — DIETITIAN INITIAL EVALUATION ADULT - OTHER INFO
Pt seen on medical floor, adm w/ sepsis 2/2 to PNA w/ c/o SOB x 3 days PTA. Pt lives at home w/ her . She does the food shopping/cooking. Denies N/V/D/C/Chewing/Swallowing issues, No food allergies. (+) wt loss reported x 1 year. Pt is consuming < 75% of meals.  Encouraged and discussed High calorie High Protein nutrition therapy. Pt is receptive to a nutritional supplement.

## 2023-12-02 NOTE — DIETITIAN INITIAL EVALUATION ADULT - PERTINENT LABORATORY DATA
12-02    140  |  108  |  10  ----------------------------<  90  3.1<L>   |  25  |  0.65    Ca    7.9<L>      02 Dec 2023 07:50    TPro  6.4  /  Alb  1.9<L>  /  TBili  0.5  /  DBili  x   /  AST  19  /  ALT  28  /  AlkPhos  92  12-02

## 2023-12-02 NOTE — DIETITIAN INITIAL EVALUATION ADULT - NUTRITIONGOAL OUTCOME1
Pt will Meet calorie and protein needs > 75% via meals / supplements to prevent wt loss during Length Of Stay.

## 2023-12-02 NOTE — DIETITIAN INITIAL EVALUATION ADULT - ETIOLOGY
Inadequate energy / protein intake; Increased energy/protein needs related to persistent decreased p.o. intake

## 2023-12-02 NOTE — DIETITIAN INITIAL EVALUATION ADULT - PERTINENT MEDS FT
MEDICATIONS  (STANDING):  albuterol/ipratropium for Nebulization 3 milliLiter(s) Nebulizer every 6 hours  amLODIPine   Tablet 10 milliGRAM(s) Oral daily  benzonatate 100 milliGRAM(s) Oral every 8 hours  budesonide 160 MICROgram(s)/formoterol 4.5 MICROgram(s) Inhaler 2 Puff(s) Inhalation two times a day  piperacillin/tazobactam IVPB.- 3.375 Gram(s) IV Intermittent once  sodium chloride 0.9%. 1000 milliLiter(s) (50 mL/Hr) IV Continuous <Continuous>    MEDICATIONS  (PRN):  acetaminophen     Tablet .. 650 milliGRAM(s) Oral every 6 hours PRN Temp greater or equal to 38C (100.4F), Mild Pain (1 - 3)  aluminum hydroxide/magnesium hydroxide/simethicone Suspension 30 milliLiter(s) Oral every 4 hours PRN Dyspepsia  melatonin 3 milliGRAM(s) Oral at bedtime PRN Insomnia  ondansetron Injectable 4 milliGRAM(s) IV Push every 8 hours PRN Nausea and/or Vomiting

## 2023-12-03 LAB
ANION GAP SERPL CALC-SCNC: 3 MMOL/L — LOW (ref 5–17)
ANION GAP SERPL CALC-SCNC: 3 MMOL/L — LOW (ref 5–17)
BUN SERPL-MCNC: 9 MG/DL — SIGNIFICANT CHANGE UP (ref 7–23)
BUN SERPL-MCNC: 9 MG/DL — SIGNIFICANT CHANGE UP (ref 7–23)
CALCIUM SERPL-MCNC: 8 MG/DL — LOW (ref 8.5–10.1)
CALCIUM SERPL-MCNC: 8 MG/DL — LOW (ref 8.5–10.1)
CHLORIDE SERPL-SCNC: 108 MMOL/L — SIGNIFICANT CHANGE UP (ref 96–108)
CHLORIDE SERPL-SCNC: 108 MMOL/L — SIGNIFICANT CHANGE UP (ref 96–108)
CO2 SERPL-SCNC: 28 MMOL/L — SIGNIFICANT CHANGE UP (ref 22–31)
CO2 SERPL-SCNC: 28 MMOL/L — SIGNIFICANT CHANGE UP (ref 22–31)
CREAT SERPL-MCNC: 0.73 MG/DL — SIGNIFICANT CHANGE UP (ref 0.5–1.3)
CREAT SERPL-MCNC: 0.73 MG/DL — SIGNIFICANT CHANGE UP (ref 0.5–1.3)
EGFR: 79 ML/MIN/1.73M2 — SIGNIFICANT CHANGE UP
EGFR: 79 ML/MIN/1.73M2 — SIGNIFICANT CHANGE UP
GLUCOSE SERPL-MCNC: 107 MG/DL — HIGH (ref 70–99)
GLUCOSE SERPL-MCNC: 107 MG/DL — HIGH (ref 70–99)
HCT VFR BLD CALC: 31.1 % — LOW (ref 34.5–45)
HCT VFR BLD CALC: 31.1 % — LOW (ref 34.5–45)
HGB BLD-MCNC: 9.8 G/DL — LOW (ref 11.5–15.5)
HGB BLD-MCNC: 9.8 G/DL — LOW (ref 11.5–15.5)
MCHC RBC-ENTMCNC: 26.5 PG — LOW (ref 27–34)
MCHC RBC-ENTMCNC: 26.5 PG — LOW (ref 27–34)
MCHC RBC-ENTMCNC: 31.5 G/DL — LOW (ref 32–36)
MCHC RBC-ENTMCNC: 31.5 G/DL — LOW (ref 32–36)
MCV RBC AUTO: 84.1 FL — SIGNIFICANT CHANGE UP (ref 80–100)
MCV RBC AUTO: 84.1 FL — SIGNIFICANT CHANGE UP (ref 80–100)
NRBC # BLD: 0 /100 WBCS — SIGNIFICANT CHANGE UP (ref 0–0)
NRBC # BLD: 0 /100 WBCS — SIGNIFICANT CHANGE UP (ref 0–0)
PLATELET # BLD AUTO: 257 K/UL — SIGNIFICANT CHANGE UP (ref 150–400)
PLATELET # BLD AUTO: 257 K/UL — SIGNIFICANT CHANGE UP (ref 150–400)
POTASSIUM SERPL-MCNC: 3.8 MMOL/L — SIGNIFICANT CHANGE UP (ref 3.5–5.3)
POTASSIUM SERPL-MCNC: 3.8 MMOL/L — SIGNIFICANT CHANGE UP (ref 3.5–5.3)
POTASSIUM SERPL-SCNC: 3.8 MMOL/L — SIGNIFICANT CHANGE UP (ref 3.5–5.3)
POTASSIUM SERPL-SCNC: 3.8 MMOL/L — SIGNIFICANT CHANGE UP (ref 3.5–5.3)
PROCALCITONIN SERPL-MCNC: 2.35 NG/ML — HIGH (ref 0.02–0.1)
PROCALCITONIN SERPL-MCNC: 2.35 NG/ML — HIGH (ref 0.02–0.1)
RBC # BLD: 3.7 M/UL — LOW (ref 3.8–5.2)
RBC # BLD: 3.7 M/UL — LOW (ref 3.8–5.2)
RBC # FLD: 16.5 % — HIGH (ref 10.3–14.5)
RBC # FLD: 16.5 % — HIGH (ref 10.3–14.5)
SODIUM SERPL-SCNC: 139 MMOL/L — SIGNIFICANT CHANGE UP (ref 135–145)
SODIUM SERPL-SCNC: 139 MMOL/L — SIGNIFICANT CHANGE UP (ref 135–145)
WBC # BLD: 8.83 K/UL — SIGNIFICANT CHANGE UP (ref 3.8–10.5)
WBC # BLD: 8.83 K/UL — SIGNIFICANT CHANGE UP (ref 3.8–10.5)
WBC # FLD AUTO: 8.83 K/UL — SIGNIFICANT CHANGE UP (ref 3.8–10.5)
WBC # FLD AUTO: 8.83 K/UL — SIGNIFICANT CHANGE UP (ref 3.8–10.5)

## 2023-12-03 PROCEDURE — 76856 US EXAM PELVIC COMPLETE: CPT | Mod: 26

## 2023-12-03 PROCEDURE — 99233 SBSQ HOSP IP/OBS HIGH 50: CPT

## 2023-12-03 RX ORDER — METOPROLOL TARTRATE 50 MG
12.5 TABLET ORAL DAILY
Refills: 0 | Status: DISCONTINUED | OUTPATIENT
Start: 2023-12-03 | End: 2023-12-06

## 2023-12-03 RX ADMIN — BUDESONIDE AND FORMOTEROL FUMARATE DIHYDRATE 2 PUFF(S): 160; 4.5 AEROSOL RESPIRATORY (INHALATION) at 06:26

## 2023-12-03 RX ADMIN — Medication 100 MILLIGRAM(S): at 21:57

## 2023-12-03 RX ADMIN — Medication 3 MILLILITER(S): at 11:43

## 2023-12-03 RX ADMIN — Medication 3 MILLILITER(S): at 05:20

## 2023-12-03 RX ADMIN — Medication 100 MILLIGRAM(S): at 12:48

## 2023-12-03 RX ADMIN — Medication 3 MILLILITER(S): at 00:27

## 2023-12-03 RX ADMIN — PIPERACILLIN AND TAZOBACTAM 25 GRAM(S): 4; .5 INJECTION, POWDER, LYOPHILIZED, FOR SOLUTION INTRAVENOUS at 12:48

## 2023-12-03 RX ADMIN — SODIUM CHLORIDE 50 MILLILITER(S): 9 INJECTION INTRAMUSCULAR; INTRAVENOUS; SUBCUTANEOUS at 12:47

## 2023-12-03 RX ADMIN — PIPERACILLIN AND TAZOBACTAM 25 GRAM(S): 4; .5 INJECTION, POWDER, LYOPHILIZED, FOR SOLUTION INTRAVENOUS at 21:56

## 2023-12-03 RX ADMIN — PIPERACILLIN AND TAZOBACTAM 25 GRAM(S): 4; .5 INJECTION, POWDER, LYOPHILIZED, FOR SOLUTION INTRAVENOUS at 00:04

## 2023-12-03 RX ADMIN — AMLODIPINE BESYLATE 10 MILLIGRAM(S): 2.5 TABLET ORAL at 06:24

## 2023-12-03 RX ADMIN — PIPERACILLIN AND TAZOBACTAM 25 GRAM(S): 4; .5 INJECTION, POWDER, LYOPHILIZED, FOR SOLUTION INTRAVENOUS at 06:24

## 2023-12-03 RX ADMIN — Medication 100 MILLIGRAM(S): at 06:24

## 2023-12-04 PROCEDURE — 99232 SBSQ HOSP IP/OBS MODERATE 35: CPT

## 2023-12-04 RX ORDER — LACTOBACILLUS ACIDOPHILUS 100MM CELL
1 CAPSULE ORAL DAILY
Refills: 0 | Status: DISCONTINUED | OUTPATIENT
Start: 2023-12-04 | End: 2023-12-06

## 2023-12-04 RX ADMIN — BUDESONIDE AND FORMOTEROL FUMARATE DIHYDRATE 2 PUFF(S): 160; 4.5 AEROSOL RESPIRATORY (INHALATION) at 06:19

## 2023-12-04 RX ADMIN — Medication 100 MILLIGRAM(S): at 22:39

## 2023-12-04 RX ADMIN — SODIUM CHLORIDE 50 MILLILITER(S): 9 INJECTION INTRAMUSCULAR; INTRAVENOUS; SUBCUTANEOUS at 22:44

## 2023-12-04 RX ADMIN — AMLODIPINE BESYLATE 10 MILLIGRAM(S): 2.5 TABLET ORAL at 06:12

## 2023-12-04 RX ADMIN — PIPERACILLIN AND TAZOBACTAM 25 GRAM(S): 4; .5 INJECTION, POWDER, LYOPHILIZED, FOR SOLUTION INTRAVENOUS at 14:22

## 2023-12-04 RX ADMIN — PIPERACILLIN AND TAZOBACTAM 25 GRAM(S): 4; .5 INJECTION, POWDER, LYOPHILIZED, FOR SOLUTION INTRAVENOUS at 22:32

## 2023-12-04 RX ADMIN — BUDESONIDE AND FORMOTEROL FUMARATE DIHYDRATE 2 PUFF(S): 160; 4.5 AEROSOL RESPIRATORY (INHALATION) at 17:55

## 2023-12-04 RX ADMIN — Medication 100 MILLIGRAM(S): at 14:22

## 2023-12-04 RX ADMIN — Medication 650 MILLIGRAM(S): at 20:15

## 2023-12-04 RX ADMIN — Medication 100 MILLIGRAM(S): at 06:11

## 2023-12-04 RX ADMIN — Medication 650 MILLIGRAM(S): at 20:57

## 2023-12-04 RX ADMIN — PIPERACILLIN AND TAZOBACTAM 25 GRAM(S): 4; .5 INJECTION, POWDER, LYOPHILIZED, FOR SOLUTION INTRAVENOUS at 06:19

## 2023-12-04 RX ADMIN — Medication 12.5 MILLIGRAM(S): at 06:13

## 2023-12-04 RX ADMIN — Medication 1 TABLET(S): at 11:19

## 2023-12-04 NOTE — CONSULT NOTE ADULT - SUBJECTIVE AND OBJECTIVE BOX
CHIEF COMPLAINT: SOB    HPI:  88 year old female with PMHx of HTN and asthma who presented to the ED on 11/30/23 for complaints of shortness of breath and cough.    Patient reports she started having trouble breathing three days ago. Associated productive cough with dark yellow sputum. Took over-the-counter antitussives without improvement of symptoms. She thought it was her asthma flaring up so she took her Symbicort but that did not help either. Denies recent travel or sick contacts. No stand-alone radiators or mold at home. No pets, carpet, or new fragrances. Presented to the ER for further evaluation. In the ED pt was febrile, tachycardic, with cxr with pna. Pt started on CAP coverage and admitted to medicine for pneumonia. Pt then continued to have fevers and hypoxemia requiring NC and pt's abx were upgraded to zosyn. CTA without PE but with significant RML/RLL pna with adjacent pleural effusion. Fevers and lekocytosis have since improved. Pulm consulted for further eval.    Subjective: Pt seen and examined at the bedside.      PAST MEDICAL & SURGICAL HISTORY:  HTN       Asthma      Tortuous aorta          FAMILY HISTORY:      SOCIAL HISTORY:  Smoking: __ packs x ___ years  EtOH Use:  Marital Status:  Occupation:  Recent Travel:  Country of Birth:  Advance Directives:    Allergies    No Known Allergies    Intolerances        HOME MEDICATIONS:    REVIEW OF SYSTEMS:  Constitutional:   Eyes:  ENT:  CV:  Resp:  GI:  :  MSK:  Integumentary:  Neurological:  Psychiatric:  Endocrine:  Hematologic/Lymphatic:  Allergic/Immunologic:  [ ] All other systems negative  [ ] Unable to assess ROS because ________    OBJECTIVE:  ICU Vital Signs Last 24 Hrs  T(C): 37.2 (04 Dec 2023 04:53), Max: 37.7 (03 Dec 2023 15:36)  T(F): 98.9 (04 Dec 2023 04:53), Max: 99.9 (03 Dec 2023 15:36)  HR: 115 (04 Dec 2023 04:53) (96 - 132)  BP: 134/82 (04 Dec 2023 04:53) (130/76 - 135/62)  BP(mean): --  ABP: --  ABP(mean): --  RR: 18 (04 Dec 2023 04:53) (16 - 18)  SpO2: 96% (04 Dec 2023 04:53) (94% - 96%)    O2 Parameters below as of 04 Dec 2023 04:53  Patient On (Oxygen Delivery Method): room air              CAPILLARY BLOOD GLUCOSE          PHYSICAL EXAM:  General:   HEENT:   Lymph Nodes:  Neck:   Respiratory:   Cardiovascular:   Abdomen:   Extremities:   Skin:   Neurological:  Psychiatry:    HOSPITAL MEDICATIONS:  MEDICATIONS  (STANDING):  amLODIPine   Tablet 10 milliGRAM(s) Oral daily  benzonatate 100 milliGRAM(s) Oral every 8 hours  budesonide 160 MICROgram(s)/formoterol 4.5 MICROgram(s) Inhaler 2 Puff(s) Inhalation two times a day  metoprolol tartrate 12.5 milliGRAM(s) Oral daily  piperacillin/tazobactam IVPB.. 3.375 Gram(s) IV Intermittent every 8 hours  sodium chloride 0.9%. 1000 milliLiter(s) (50 mL/Hr) IV Continuous <Continuous>    MEDICATIONS  (PRN):  acetaminophen     Tablet .. 650 milliGRAM(s) Oral every 6 hours PRN Temp greater or equal to 38C (100.4F), Mild Pain (1 - 3)  aluminum hydroxide/magnesium hydroxide/simethicone Suspension 30 milliLiter(s) Oral every 4 hours PRN Dyspepsia  melatonin 3 milliGRAM(s) Oral at bedtime PRN Insomnia  ondansetron Injectable 4 milliGRAM(s) IV Push every 8 hours PRN Nausea and/or Vomiting      LABS:                        9.8    8.83  )-----------( 257      ( 03 Dec 2023 07:30 )             31.1     12-03    139  |  108  |  9   ----------------------------<  107<H>  3.8   |  28  |  0.73    Ca    8.0<L>      03 Dec 2023 07:30        Urinalysis Basic - ( 03 Dec 2023 07:30 )    Color: x / Appearance: x / SG: x / pH: x  Gluc: 107 mg/dL / Ketone: x  / Bili: x / Urobili: x   Blood: x / Protein: x / Nitrite: x   Leuk Esterase: x / RBC: x / WBC x   Sq Epi: x / Non Sq Epi: x / Bacteria: x            MICROBIOLOGY:     RADIOLOGY:  [ ] Reviewed and interpreted by me    EKG: CHIEF COMPLAINT: SOB    HPI:  88 year old female with PMHx of HTN and asthma who presented to the ED on 11/30/23 for complaints of shortness of breath and cough.    Patient reports she started having trouble breathing three days ago. Associated productive cough with dark yellow sputum. Took over-the-counter antitussives without improvement of symptoms. She thought it was her asthma flaring up so she took her Symbicort but that did not help either. Denies recent travel or sick contacts. No stand-alone radiators or mold at home. No pets, carpet, or new fragrances. Presented to the ER for further evaluation. In the ED pt was febrile, tachycardic, with cxr with pna. Pt started on CAP coverage and admitted to medicine for pneumonia. Pt then continued to have fevers and hypoxemia requiring NC and pt's abx were upgraded to zosyn. CTA without PE but with significant RML/RLL pna with adjacent pleural effusion. Fevers and lekocytosis have since improved. Pulm consulted for further eval.    Subjective: Pt seen and examined at the bedside. Pt endorses feeling much better since coming to the hospital. Pt denies CP, SOB, wheezing, coughing.  Pt satting 97% on RA    PAST MEDICAL & SURGICAL HISTORY:  HTN       Asthma      Tortuous aorta          FAMILY HISTORY:      SOCIAL HISTORY:  Smoking: denies    EtOH Use: denies   Marital Status:  Occupation:  Recent Travel: denies   Country of Birth:  Advance Directives:    Allergies    No Known Allergies    Intolerances        HOME MEDICATIONS:    REVIEW OF SYSTEMS:  Constitutional:   Eyes:  ENT:  CV:  Resp:  GI:  :  MSK:  Integumentary:  Neurological:  Psychiatric:  Endocrine:  Hematologic/Lymphatic:  Allergic/Immunologic:  [x ] All other systems negative      OBJECTIVE:  ICU Vital Signs Last 24 Hrs  T(C): 37.2 (04 Dec 2023 04:53), Max: 37.7 (03 Dec 2023 15:36)  T(F): 98.9 (04 Dec 2023 04:53), Max: 99.9 (03 Dec 2023 15:36)  HR: 115 (04 Dec 2023 04:53) (96 - 132)  BP: 134/82 (04 Dec 2023 04:53) (130/76 - 135/62)  BP(mean): --  ABP: --  ABP(mean): --  RR: 18 (04 Dec 2023 04:53) (16 - 18)  SpO2: 96% (04 Dec 2023 04:53) (94% - 96%)    O2 Parameters below as of 04 Dec 2023 04:53  Patient On (Oxygen Delivery Method): room air              CAPILLARY BLOOD GLUCOSE          PHYSICAL EXAM:  GENERAL: NAD, lying in bed comfortably  HEAD:  Atraumatic, normocephalic  EYES: EOMI, PERRLA, conjunctiva and sclera clear  NECK: Supple, trachea midline, no JVD  HEART: Regular rate and rhythm, no murmurs, rubs, or gallops  LUNGS: Unlabored respirations.  Clear to auscultation bilaterally, no crackles, wheezing, or rhonchi  ABDOMEN: Soft, nontender, nondistended, +BS  EXTREMITIES: 2+ peripheral pulses bilaterally. No clubbing, cyanosis, or edema  NERVOUS SYSTEM:  A&Ox3, moving all extremities, no focal deficits   SKIN: No rashes or lesions    HOSPITAL MEDICATIONS:  MEDICATIONS  (STANDING):  amLODIPine   Tablet 10 milliGRAM(s) Oral daily  benzonatate 100 milliGRAM(s) Oral every 8 hours  budesonide 160 MICROgram(s)/formoterol 4.5 MICROgram(s) Inhaler 2 Puff(s) Inhalation two times a day  metoprolol tartrate 12.5 milliGRAM(s) Oral daily  piperacillin/tazobactam IVPB.. 3.375 Gram(s) IV Intermittent every 8 hours  sodium chloride 0.9%. 1000 milliLiter(s) (50 mL/Hr) IV Continuous <Continuous>    MEDICATIONS  (PRN):  acetaminophen     Tablet .. 650 milliGRAM(s) Oral every 6 hours PRN Temp greater or equal to 38C (100.4F), Mild Pain (1 - 3)  aluminum hydroxide/magnesium hydroxide/simethicone Suspension 30 milliLiter(s) Oral every 4 hours PRN Dyspepsia  melatonin 3 milliGRAM(s) Oral at bedtime PRN Insomnia  ondansetron Injectable 4 milliGRAM(s) IV Push every 8 hours PRN Nausea and/or Vomiting      LABS:                        9.8    8.83  )-----------( 257      ( 03 Dec 2023 07:30 )             31.1     12-03    139  |  108  |  9   ----------------------------<  107<H>  3.8   |  28  |  0.73    Ca    8.0<L>      03 Dec 2023 07:30        Urinalysis Basic - ( 03 Dec 2023 07:30 )    Color: x / Appearance: x / SG: x / pH: x  Gluc: 107 mg/dL / Ketone: x  / Bili: x / Urobili: x   Blood: x / Protein: x / Nitrite: x   Leuk Esterase: x / RBC: x / WBC x   Sq Epi: x / Non Sq Epi: x / Bacteria: x            MICROBIOLOGY: reviewed    RADIOLOGY:  [ x] Reviewed and interpreted by me    EKG: reviewed

## 2023-12-04 NOTE — CONSULT NOTE ADULT - ASSESSMENT
Josue Claudio is an 88 year old female with PMHx of HTN and asthma who presented to the ED on 11/30/23 for complaints of shortness of breath and cough and admitted for sepsis secondary to PNA. Pulm consulted for further eval.    Dx: CAP    -CTA with RML/RLL pna with adjacent small pleural effusion  -pt satting well on RA. maintain sats>92%  -cont abx for pna   -cont home symbicort     note incomplete Josue Claudio is an 88 year old female with PMHx of HTN and asthma who presented to the ED on 11/30/23 for complaints of shortness of breath and cough and admitted for sepsis secondary to PNA. Pulm consulted for further eval.    Dx: CAP    -CTA with RML/RLL pna with adjacent small pleural effusion  -pt satting well on RA. maintain sats>92%  -cont abx for pna   -cont home symbicort   -rest of mngmt per primary team    d/w dr carter

## 2023-12-05 LAB
CULTURE RESULTS: SIGNIFICANT CHANGE UP
SPECIMEN SOURCE: SIGNIFICANT CHANGE UP

## 2023-12-05 PROCEDURE — 99232 SBSQ HOSP IP/OBS MODERATE 35: CPT

## 2023-12-05 RX ADMIN — Medication 650 MILLIGRAM(S): at 22:51

## 2023-12-05 RX ADMIN — PIPERACILLIN AND TAZOBACTAM 25 GRAM(S): 4; .5 INJECTION, POWDER, LYOPHILIZED, FOR SOLUTION INTRAVENOUS at 21:39

## 2023-12-05 RX ADMIN — BUDESONIDE AND FORMOTEROL FUMARATE DIHYDRATE 2 PUFF(S): 160; 4.5 AEROSOL RESPIRATORY (INHALATION) at 05:49

## 2023-12-05 RX ADMIN — SODIUM CHLORIDE 50 MILLILITER(S): 9 INJECTION INTRAMUSCULAR; INTRAVENOUS; SUBCUTANEOUS at 18:56

## 2023-12-05 RX ADMIN — Medication 12.5 MILLIGRAM(S): at 05:46

## 2023-12-05 RX ADMIN — Medication 1 TABLET(S): at 11:08

## 2023-12-05 RX ADMIN — Medication 100 MILLIGRAM(S): at 05:45

## 2023-12-05 RX ADMIN — AMLODIPINE BESYLATE 10 MILLIGRAM(S): 2.5 TABLET ORAL at 05:45

## 2023-12-05 RX ADMIN — PIPERACILLIN AND TAZOBACTAM 25 GRAM(S): 4; .5 INJECTION, POWDER, LYOPHILIZED, FOR SOLUTION INTRAVENOUS at 14:04

## 2023-12-05 RX ADMIN — Medication 100 MILLIGRAM(S): at 14:04

## 2023-12-05 RX ADMIN — SODIUM CHLORIDE 50 MILLILITER(S): 9 INJECTION INTRAMUSCULAR; INTRAVENOUS; SUBCUTANEOUS at 21:39

## 2023-12-05 RX ADMIN — PIPERACILLIN AND TAZOBACTAM 25 GRAM(S): 4; .5 INJECTION, POWDER, LYOPHILIZED, FOR SOLUTION INTRAVENOUS at 05:45

## 2023-12-05 RX ADMIN — BUDESONIDE AND FORMOTEROL FUMARATE DIHYDRATE 2 PUFF(S): 160; 4.5 AEROSOL RESPIRATORY (INHALATION) at 17:30

## 2023-12-05 RX ADMIN — Medication 100 MILLIGRAM(S): at 21:39

## 2023-12-05 NOTE — PROGRESS NOTE ADULT - ASSESSMENT
Josue Claudio is an 88 year old female with PMHx of HTN and asthma who presented to the ED on 11/30/23 for complaints of shortness of breath and cough and admitted for sepsis secondary to PNA.    Sepsis secondary to right lung community acquired PNA.   Patient has BROWNING and compensatory sinus tachycardia. cont iv zosyn inpatient x 5 days (Day 3/5)   PCT and leukocytosis improved.   Discontinued albuterol as it might be contributing to tachycardia.   consulted Pulmonary service. Follow recs.   No need for telemonitoring.     Lactic acidosis secondary to above, resolved    Hypokalemia  Repleted.     Normocytic anemia  anemia close to baseline. stable Hb.     Hematochezia vs blood vaginal discharge. 10 mm thick uterine wall on US. Outpatient gyn follow up is recommended.     Sinus tachycardia. cont low dose metoprolol with holding parameters.    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 cont amlodipine  Asthma: Cont Symbicort.  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 No indication for any intervention except adequate blood pressure control    DNR/DNI  SCDs    
Josue Claudio is an 88 year old female with PMHx of HTN and asthma who presented to the ED on 11/30/23 for complaints of shortness of breath and cough and admitted for sepsis secondary to PNA.    Sepsis secondary to PNA  Complaints of shortness of breath and productive cough x 3 days  Temp 103.1, , RR 25, WBC 16.34K on admission  CXR with RML and RLL patchy infiltrates  U/A unremarkable. 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 Plan of care discussed with  and son at bedside.
Josue Claudio is an 88 year old female with PMHx of HTN and asthma who presented to the ED on 11/30/23 for complaints of shortness of breath and cough and admitted for sepsis secondary to PNA.    Sepsis secondary to right lung community acquired PNA.   patient showed no improvement in cough and sob despite iv antibiotics  now febrile and tachycardic.   Blood culture no growth so far. MRSA pending  Change antibiotic to iv zosyn and start gentle hydration  reported hemoptysis. Check CTA chest to evaluate for any PE and to monitor progress of pna.       Lactic acidosis secondary to above, resolved  Lactic acid 3.3 on admission. check lactic acid and procalcitonin.     Hypokalemia  worse. replete again and recheck.     Normocytic anemia  anemia close to baseline. Trend CBC    Hematochezia vs blood vaginal discharge. check Pelvic US.       Chronic medical conditions:   HTN: controlled. ccont amlodipine  Asthma: Cont Symbicort and start duoneb.   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 No indication for any intervention except adequate blood pressure control    DNR/DNI  SCDs    Time spent by me managing the patient is 54 mins     
Josue Claudio is an 88 year old female with PMHx of HTN and asthma who presented to the ED on 11/30/23 for complaints of shortness of breath and cough and admitted for sepsis secondary to PNA. Pulm consulted for further eval.    Dx: CAP    -CTA with RML/RLL pna with adjacent small pleural effusion  -SpO2 stable on room air. maintain sats>92%  -continue/ complete course of abx for pna   -BCx NTD  -cont home symbicort   -rest of mngmt per primary team  - Pulmonary to sign off, please reconsult as needed.     - Plan of care discussed with Pulmonology attending Dr. Ortega. 
Jouse Claudio is an 88 year old female with PMHx of HTN and asthma who presented to the ED on 11/30/23 for complaints of shortness of breath and cough and admitted for sepsis secondary to PNA.    Sepsis secondary to right lung community acquired PNA  Complaints of shortness of breath and productive cough x 3 days  Temp 103.1, , RR 25, WBC 16.34K on admission  CXR with RML and RLL patchy infiltrates  U/A unremarkable. RVP negative  S/p NS 1650 cc bolus, ceftriaxone 1 g IV, azithromycin 500 mg IV in the ED  Cont ceftriaxone and azithromycin  Antitussives continued  Pending procal, blood cultures  still persistent leukocytosis. Trend.       Lactic acidosis secondary to above, resolved  Lactic acid 3.3 on admission. Trended down.   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    
Josue Claudio is an 88 year old female with PMHx of HTN and asthma who presented to the ED on 11/30/23 for complaints of shortness of breath and cough and admitted for sepsis secondary to PNA.    Sepsis secondary to right lung community acquired PNA.   improving slowly. Cont iv zosyn inpatient x 5 days (Day 4/5)   Cont with bronchodilators.     Lactic acidosis secondary to above, resolved    Hypokalemia  Repleted.     Normocytic anemia  anemia close to baseline. stable Hb.     Hematochezia vs blood vaginal discharge. 10 mm thick uterine wall on US.     Sinus tachycardia. cont low dose metoprolol with holding parameters.    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 cont amlodipine  Asthma: Cont Symbicort.  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 No indication for any intervention except adequate blood pressure control    DNR/DNI  SCDs  Discharge barriers: Medical improvement and Needs one more day of iv zosyn.   
Josue Claudio is an 88 year old female with PMHx of HTN and asthma who presented to the ED on 11/30/23 for complaints of shortness of breath and cough and admitted for sepsis secondary to PNA.    Sepsis secondary to right lung community acquired PNA.   Patient has BROWNING and compensatory sinus tachycardia. cont iv zosyn inpatient.   PCT and leukocytosis reviewed.   DC albuterol as it might be contributing to tachycardia.   Pulm consult in am.   cont tele x 24 hours.       Lactic acidosis secondary to above, resolved      Hypokalemia  Repleted.     Normocytic anemia  anemia close to baseline. Trend CBC    Hematochezia vs blood vaginal discharge. Pending Pelvic US.     Sinus tachycardia. cont tele. Start low dose metoprolol and monitor.     Chronic medical conditions:   HTN: controlled. ccont amlodipine  Asthma: Cont Symbicort.  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 No indication for any intervention except adequate blood pressure control    DNR/DNI  SCDs    Time spent by me managing the patient is 52 mins

## 2023-12-05 NOTE — PROGRESS NOTE ADULT - SUBJECTIVE AND OBJECTIVE BOX
CHIEF COMPLAINT: sob better  + cough  no chest pain  reported no vaginal discharge with blood but reported mild intermittent hematochezia.   no diarrhea  +BROWNING  + low grade fever       PHYSICAL EXAM:    GENERAL: Thinly built, on [place] oxygen   CHEST/LUNG: No wheezing. + crackles right side   HEART: regular and tachycardic   ABDOMEN: Soft, Nontender, Nondistended; Bowel sounds present  NERVOUS SYSTEM:  Grossly non focal.  Psychiatry: AAO x 2-3. Has dementia      OBJECTIVE DATA:     Vital Signs Last 24 Hrs  T(C): 37.2 (03 Dec 2023 10:56), Max: 37.3 (03 Dec 2023 00:03)  T(F): 98.9 (03 Dec 2023 10:56), Max: 99.2 (03 Dec 2023 00:03)  HR: 97 (03 Dec 2023 11:41) (91 - 101)  BP: 135/62 (03 Dec 2023 10:56) (126/76 - 135/62)  BP(mean): --  RR: 18 (03 Dec 2023 10:56) (18 - 18)  SpO2: 96% (03 Dec 2023 11:41) (95% - 98%)    Parameters below as of 03 Dec 2023 11:41  Patient On (Oxygen Delivery Method): room air               Daily     Daily   LABS:                        9.8    8.83  )-----------( 257      ( 03 Dec 2023 07:30 )             31.1             12-03    139  |  108  |  9   ----------------------------<  107<H>  3.8   |  28  |  0.73    Ca    8.0<L>      03 Dec 2023 07:30    TPro  6.4  /  Alb  1.9<L>  /  TBili  0.5  /  DBili  x   /  AST  19  /  ALT  28  /  AlkPhos  92  12-02                Urinalysis Basic - ( 03 Dec 2023 07:30 )    Color: x / Appearance: x / SG: x / pH: x  Gluc: 107 mg/dL / Ketone: x  / Bili: x / Urobili: x   Blood: x / Protein: x / Nitrite: x   Leuk Esterase: x / RBC: x / WBC x   Sq Epi: x / Non Sq Epi: x / Bacteria: x           CAPILLARY BLOOD GLUCOSE          Culture - Urine (collected 11-30)  Source: Clean Catch Clean Catch (Midstream)  Final Report (12-02):    <10,000 CFU/mL Normal Urogenital Rody    Culture - Blood (collected 11-30)  Source: .Blood Blood-Peripheral  Preliminary Report (12-02):    No growth at 48 Hours    Culture - Blood (collected 11-30)  Source: .Blood Blood-Peripheral  Preliminary Report (12-02):    No growth at 48 Hours          Interval Radiology studies: reviewed by me     < from: CT Angio Chest PE Protocol w/ IV Cont (12.02.23 @ 15:15) >  IMPRESSION: No pulmonary embolus is noted.    Patchy opacities/consolidation are noted in the right middle and right   lower lobes. Follow-up CT scan is recommended in 3 months to ensure   complete resolution.    < end of copied text >      Tele reviewed by me sinus tachycardia    MEDICATIONS  (STANDING):  amLODIPine   Tablet 10 milliGRAM(s) Oral daily  benzonatate 100 milliGRAM(s) Oral every 8 hours  budesonide 160 MICROgram(s)/formoterol 4.5 MICROgram(s) Inhaler 2 Puff(s) Inhalation two times a day  metoprolol tartrate 12.5 milliGRAM(s) Oral daily  piperacillin/tazobactam IVPB.. 3.375 Gram(s) IV Intermittent every 8 hours  sodium chloride 0.9%. 1000 milliLiter(s) (50 mL/Hr) IV Continuous <Continuous>    MEDICATIONS  (PRN):  acetaminophen     Tablet .. 650 milliGRAM(s) Oral every 6 hours PRN Temp greater or equal to 38C (100.4F), Mild Pain (1 - 3)  aluminum hydroxide/magnesium hydroxide/simethicone Suspension 30 milliLiter(s) Oral every 4 hours PRN Dyspepsia  melatonin 3 milliGRAM(s) Oral at bedtime PRN Insomnia  ondansetron Injectable 4 milliGRAM(s) IV Push every 8 hours PRN Nausea and/or Vomiting      
INTERVAL HPI/OVERNIGHT EVENTS:  Pt seen and examined at bedside.     Allergies/Intolerance: No Known Allergies      MEDICATIONS  (STANDING):  amLODIPine   Tablet 10 milliGRAM(s) Oral daily  azithromycin   Tablet 500 milliGRAM(s) Oral daily  benzonatate 100 milliGRAM(s) Oral every 8 hours  budesonide 160 MICROgram(s)/formoterol 4.5 MICROgram(s) Inhaler 2 Puff(s) Inhalation two times a day  cefTRIAXone   IVPB 1000 milliGRAM(s) IV Intermittent every 24 hours    MEDICATIONS  (PRN):  acetaminophen     Tablet .. 650 milliGRAM(s) Oral every 6 hours PRN Temp greater or equal to 38C (100.4F), Mild Pain (1 - 3)  aluminum hydroxide/magnesium hydroxide/simethicone Suspension 30 milliLiter(s) Oral every 4 hours PRN Dyspepsia  melatonin 3 milliGRAM(s) Oral at bedtime PRN Insomnia  ondansetron Injectable 4 milliGRAM(s) IV Push every 8 hours PRN Nausea and/or Vomiting        ROS: all systems reviewed and wnl      PHYSICAL EXAMINATION:  Vital Signs Last 24 Hrs  T(C): 37.2 (01 Dec 2023 04:39), Max: 39.5 (2023 14:31)  T(F): 98.9 (01 Dec 2023 04:39), Max: 103.1 (2023 14:31)  HR: 96 (01 Dec 2023 04:39) (92 - 118)  BP: 126/70 (01 Dec 2023 04:39) (122/73 - 149/77)  BP(mean): --  RR: 18 (01 Dec 2023 04:39) (17 - 27)  SpO2: 95% (01 Dec 2023 04:39) (93% - 98%)    Parameters below as of 01 Dec 2023 04:39  Patient On (Oxygen Delivery Method): room air      CAPILLARY BLOOD GLUCOSE            GENERAL: stable, no CP or fevers.   NECK: supple, No JVD  CHEST/LUNG: clear to auscultation bilaterally; no rales, rhonchi, or wheezing b/l  HEART: normal S1, S2  ABDOMEN: BS+, soft, ND, NT   EXTREMITIES:  pulses palpable; no clubbing, cyanosis, or edema b/l LEs  SKIN: no rashes or lesions      LABS:                        10.6   16.41 )-----------( 178      ( 01 Dec 2023 05:29 )             32.5         139  |  108  |  26<H>  ----------------------------<  149<H>  3.4<L>   |  22  |  1.24    Ca    8.6      2023 12:40    TPro  7.5  /  Alb  2.5<L>  /  TBili  0.8  /  DBili  x   /  AST  42<H>  /  ALT  53  /  AlkPhos  105  -    PT/INR - ( 2023 12:40 )   PT: 13.8 sec;   INR: 1.16 ratio         PTT - ( 2023 12:40 )  PTT:28.7 sec  Urinalysis Basic - ( 2023 14:20 )    Color: Dark Yellow / Appearance: Cloudy / S.026 / pH: x  Gluc: x / Ketone: Trace mg/dL  / Bili: Small / Urobili: 1.0 mg/dL   Blood: x / Protein: 100 mg/dL / Nitrite: Negative   Leuk Esterase: Negative / RBC: Negative /HPF / WBC 0-2 /HPF   Sq Epi: x / Non Sq Epi: x / Bacteria: Few /HPF            
Interval Events: Patient seen and examined at bedside. no acute overnight events. Remains on room air with SpO2 stable. Endorses overall improvement. Denies any medical complaints.     REVIEW OF SYSTEMS:  All negative unless listed within interval HPI       OBJECTIVE:  Vital Signs Last 24 Hrs  T(C): 37.7 (05 Dec 2023 16:26), Max: 37.7 (05 Dec 2023 16:26)  T(F): 99.9 (05 Dec 2023 16:26), Max: 99.9 (05 Dec 2023 16:26)  HR: 90 (05 Dec 2023 16:26) (90 - 94)  BP: 147/73 (05 Dec 2023 16:26) (124/72 - 147/73)  BP(mean): --  ABP: --  ABP(mean): --  RR: 18 (05 Dec 2023 16:26) (17 - 18)  SpO2: 93% (05 Dec 2023 16:26) (93% - 95%)    O2 Parameters below as of 05 Dec 2023 16:26  Patient On (Oxygen Delivery Method): room air              12-05 @ 07:01  -  12-05 @ 18:30  --------------------------------------------------------  IN: 540 mL / OUT: 0 mL / NET: 540 mL      CAPILLARY BLOOD GLUCOSE          PHYSICAL EXAM:  GENERAL: NAD, lying in bed comfortably  HEAD:  Atraumatic, normocephalic  EYES: PERRL, conjunctiva and sclera clear  NECK: Supple, trachea midline, no JVD  HEART: Regular rate and rhythm, no murmurs, rubs, or gallops  LUNGS: Unlabored respirations.  Clear to auscultation bilaterally, no crackles, wheezing, or rhonchi  ABDOMEN: Soft, nontender, nondistended, +BS  EXTREMITIES: 2+ peripheral pulses bilaterally. No clubbing, cyanosis, or edema  NERVOUS SYSTEM:  A&Ox3, moving all extremities, no focal deficits   SKIN: No rashes or lesions    HOSPITAL MEDICATIONS:    piperacillin/tazobactam IVPB.. 3.375 Gram(s) IV Intermittent every 8 hours    amLODIPine   Tablet 10 milliGRAM(s) Oral daily  metoprolol tartrate 12.5 milliGRAM(s) Oral daily      benzonatate 100 milliGRAM(s) Oral every 8 hours  budesonide 160 MICROgram(s)/formoterol 4.5 MICROgram(s) Inhaler 2 Puff(s) Inhalation two times a day    acetaminophen     Tablet .. 650 milliGRAM(s) Oral every 6 hours PRN  melatonin 3 milliGRAM(s) Oral at bedtime PRN  ondansetron Injectable 4 milliGRAM(s) IV Push every 8 hours PRN    aluminum hydroxide/magnesium hydroxide/simethicone Suspension 30 milliLiter(s) Oral every 4 hours PRN        sodium chloride 0.9%. 1000 milliLiter(s) IV Continuous <Continuous>        lactobacillus acidophilus 1 Tablet(s) Oral daily      LABS:    Hgb Trend: 9.8<--, 10.3<--, 10.6<--, 11.4<--        Creatinine Trend: 0.73<--, 0.65<--, 1.24<--            MICROBIOLOGY:     Culture - Urine (11.30.23 @ 14:20)    Specimen Source: Clean Catch Clean Catch (Midstream)   Culture Results:   <10,000 CFU/mL Normal Urogenital Rody    Culture - Blood (11.30.23 @ 12:45)    Specimen Source: .Blood Blood-Peripheral   Culture Results:   No growth at 5 days    Culture - Blood (11.30.23 @ 12:40)    Specimen Source: .Blood Blood-Peripheral   Culture Results:   No growth at 5 days        RADIOLOGY:  [X] Reviewed 
CHIEF COMPLAINT: continues to have cough and sob   febrile low grade  no chest pain   no leg swelling or calf pains reported  I called daughter and spoke with son,  also.   patient has hx of stable thoracic descending aneurysm and asthma  patient is having some blood in stool vs bloody vaginal discharge.   reported colonoscopy in remote past and no abnormality reported       PHYSICAL EXAM:    GENERAL: Thinly built, on [place] oxygen   CHEST/LUNG:  crackles bibasally now with scattered wheezing   HEART: Irregular and tachycardic   ABDOMEN: Soft, Nontender, Nondistended; Bowel sounds present  NERVOUS SYSTEM:  Grossly non focal.  Psychiatry: AAO x 2-3. Has dementia      OBJECTIVE DATA:     Vital Signs Last 24 Hrs  T(C): 37.8 (02 Dec 2023 14:15), Max: 37.9 (02 Dec 2023 12:00)  T(F): 100 (02 Dec 2023 14:15), Max: 100.2 (02 Dec 2023 12:00)  HR: 129 (02 Dec 2023 12:00) (101 - 129)  BP: 112/66 (02 Dec 2023 11:39) (112/66 - 134/68)  BP(mean): --  RR: 18 (02 Dec 2023 11:39) (18 - 19)  SpO2: 94% (02 Dec 2023 11:39) (94% - 96%)    Parameters below as of 02 Dec 2023 11:39  Patient On (Oxygen Delivery Method): room air               Daily     Daily   LABS:                        10.3   13.08 )-----------( 245      ( 02 Dec 2023 07:50 )             32.1             12-02    140  |  108  |  10  ----------------------------<  90  3.1<L>   |  25  |  0.65    Ca    7.9<L>      02 Dec 2023 07:50    TPro  6.4  /  Alb  1.9<L>  /  TBili  0.5  /  DBili  x   /  AST  19  /  ALT  28  /  AlkPhos  92  12-02                Urinalysis Basic - ( 02 Dec 2023 07:50 )    Color: x / Appearance: x / SG: x / pH: x  Gluc: 90 mg/dL / Ketone: x  / Bili: x / Urobili: x   Blood: x / Protein: x / Nitrite: x   Leuk Esterase: x / RBC: x / WBC x   Sq Epi: x / Non Sq Epi: x / Bacteria: x           CAPILLARY BLOOD GLUCOSE          Culture - Blood (collected 11-30)  Source: .Blood Blood-Peripheral  Preliminary Report (12-01):    No growth at 24 hours    Culture - Blood (collected 11-30)  Source: .Blood Blood-Peripheral  Preliminary Report (12-01):    No growth at 24 hours    Interval Radiology studies: reviewed   CTA chest and US pelvis pending     MEDICATIONS  (STANDING):  albuterol/ipratropium for Nebulization 3 milliLiter(s) Nebulizer every 6 hours  amLODIPine   Tablet 10 milliGRAM(s) Oral daily  benzonatate 100 milliGRAM(s) Oral every 8 hours  budesonide 160 MICROgram(s)/formoterol 4.5 MICROgram(s) Inhaler 2 Puff(s) Inhalation two times a day  piperacillin/tazobactam IVPB. 3.375 Gram(s) IV Intermittent once  piperacillin/tazobactam IVPB.- 3.375 Gram(s) IV Intermittent once  potassium chloride    Tablet ER 40 milliEquivalent(s) Oral every 4 hours  sodium chloride 0.9%. 1000 milliLiter(s) (50 mL/Hr) IV Continuous <Continuous>    MEDICATIONS  (PRN):  acetaminophen     Tablet .. 650 milliGRAM(s) Oral every 6 hours PRN Temp greater or equal to 38C (100.4F), Mild Pain (1 - 3)  aluminum hydroxide/magnesium hydroxide/simethicone Suspension 30 milliLiter(s) Oral every 4 hours PRN Dyspepsia  melatonin 3 milliGRAM(s) Oral at bedtime PRN Insomnia  ondansetron Injectable 4 milliGRAM(s) IV Push every 8 hours PRN Nausea and/or Vomiting      
CHIEF COMPLAINT: FU of right sided PNA  + cough  +SOB  +fever  No chest pain or calf pain or leg swelling       PHYSICAL EXAM:    GENERAL: Thinly built, no acute distress   CHEST/LUNG:  No wheezing. +crackles right lung.   HEART: Regular rate and rhythm; No murmurs  ABDOMEN: Soft, Nontender, Nondistended; Bowel sounds present  EXTREMITIES:  No clubbing, cyanosis, or edema  NERVOUS SYSTEM:  Grossly non focal.  Psychiatry: AAO x 3, mood is appropriate       OBJECTIVE DATA:   Vital Signs Last 24 Hrs  T(C): 37.7 (01 Dec 2023 15:32), Max: 37.7 (01 Dec 2023 15:32)  T(F): 99.8 (01 Dec 2023 15:32), Max: 99.8 (01 Dec 2023 15:32)  HR: 104 (01 Dec 2023 15:32) (92 - 121)  BP: 127/75 (01 Dec 2023 15:32) (122/73 - 151/79)  BP(mean): --  RR: 19 (01 Dec 2023 15:32) (18 - 25)  SpO2: 94% (01 Dec 2023 15:32) (93% - 96%)    Parameters below as of 01 Dec 2023 15:32  Patient On (Oxygen Delivery Method): room air               Daily     Daily   LABS:                        10.6   16.41 )-----------( 178      ( 01 Dec 2023 05:29 )             32.5             11-30    139  |  108  |  26<H>  ----------------------------<  149<H>  3.4<L>   |  22  |  1.24    Ca    8.6      2023 12:40    TPro  7.5  /  Alb  2.5<L>  /  TBili  0.8  /  DBili  x   /  AST  42<H>  /  ALT  53  /  AlkPhos  105  11-30              PT/INR - ( 2023 12:40 )   PT: 13.8 sec;   INR: 1.16 ratio         PTT - ( 2023 12:40 )  PTT:28.7 sec  Urinalysis Basic - ( 2023 14:20 )    Color: Dark Yellow / Appearance: Cloudy / S.026 / pH: x  Gluc: x / Ketone: Trace mg/dL  / Bili: Small / Urobili: 1.0 mg/dL   Blood: x / Protein: 100 mg/dL / Nitrite: Negative   Leuk Esterase: Negative / RBC: Negative /HPF / WBC 0-2 /HPF   Sq Epi: x / Non Sq Epi: x / Bacteria: Few /HPF            Interval Radiology studies: reviewed by me    < from: Xray Chest 1 View- PORTABLE-Urgent (23 @ 13:31) >  IMPRESSION:  Calcified tortuous thoracic aorta with saccular descending   thoracic aortic aneurysm again seen. Limited size comparison of the   aneurysm due to differing technique and patient position.    Increased reticular and patchy right mid and lower lung opacities, of   indeterminate nature, but could be due to infection. A short-term   follow-up chest x-ray could be obtained to evaluate for resolution.    < end of copied text >      MEDICATIONS  (STANDING):  amLODIPine   Tablet 10 milliGRAM(s) Oral daily  azithromycin   Tablet 500 milliGRAM(s) Oral daily  benzonatate 100 milliGRAM(s) Oral every 8 hours  budesonide 160 MICROgram(s)/formoterol 4.5 MICROgram(s) Inhaler 2 Puff(s) Inhalation two times a day  cefTRIAXone   IVPB 1000 milliGRAM(s) IV Intermittent every 24 hours    MEDICATIONS  (PRN):  acetaminophen     Tablet .. 650 milliGRAM(s) Oral every 6 hours PRN Temp greater or equal to 38C (100.4F), Mild Pain (1 - 3)  aluminum hydroxide/magnesium hydroxide/simethicone Suspension 30 milliLiter(s) Oral every 4 hours PRN Dyspepsia  melatonin 3 milliGRAM(s) Oral at bedtime PRN Insomnia  ondansetron Injectable 4 milliGRAM(s) IV Push every 8 hours PRN Nausea and/or Vomiting      
CHIEF COMPLAINT: Cough present still  sob better  no chest pain   no abd pain   no n/v  No fever      PHYSICAL EXAM:  GENERAL: Thinly built, room air    CHEST/LUNG: No wheezing. few crackles right side.   HEART: regular and no murmur  ABDOMEN: Soft, Nontender, Nondistended; Bowel sounds present  NERVOUS SYSTEM:  Grossly non focal.  Psychiatry: AAO x 2-3. Has dementia      OBJECTIVE DATA:     Vital Signs Last 24 Hrs  T(C): 36.8 (05 Dec 2023 10:59), Max: 37.4 (04 Dec 2023 16:22)  T(F): 98.2 (05 Dec 2023 10:59), Max: 99.3 (04 Dec 2023 16:22)  HR: 91 (05 Dec 2023 10:59) (91 - 95)  BP: 136/75 (05 Dec 2023 10:59) (124/68 - 136/75)  BP(mean): --  RR: 17 (05 Dec 2023 10:59) (17 - 18)  SpO2: 95% (05 Dec 2023 10:59) (93% - 95%)    Parameters below as of 05 Dec 2023 10:59  Patient On (Oxygen Delivery Method): room air        Culture - Urine (collected 11-30)  Source: Clean Catch Clean Catch (Midstream)  Final Report (12-02):    <10,000 CFU/mL Normal Urogenital Rody    Culture - Blood (collected 11-30)  Source: .Blood Blood-Peripheral  Preliminary Report (12-04):    No growth at 4 days    Culture - Blood (collected 11-30)  Source: .Blood Blood-Peripheral  Preliminary Report (12-04):    No growth at 4 days          Interval Radiology studies: reviewed   MEDICATIONS  (STANDING):  amLODIPine   Tablet 10 milliGRAM(s) Oral daily  benzonatate 100 milliGRAM(s) Oral every 8 hours  budesonide 160 MICROgram(s)/formoterol 4.5 MICROgram(s) Inhaler 2 Puff(s) Inhalation two times a day  lactobacillus acidophilus 1 Tablet(s) Oral daily  metoprolol tartrate 12.5 milliGRAM(s) Oral daily  piperacillin/tazobactam IVPB.. 3.375 Gram(s) IV Intermittent every 8 hours  sodium chloride 0.9%. 1000 milliLiter(s) (50 mL/Hr) IV Continuous <Continuous>    MEDICATIONS  (PRN):  acetaminophen     Tablet .. 650 milliGRAM(s) Oral every 6 hours PRN Temp greater or equal to 38C (100.4F), Mild Pain (1 - 3)  aluminum hydroxide/magnesium hydroxide/simethicone Suspension 30 milliLiter(s) Oral every 4 hours PRN Dyspepsia  melatonin 3 milliGRAM(s) Oral at bedtime PRN Insomnia  ondansetron Injectable 4 milliGRAM(s) IV Push every 8 hours PRN Nausea and/or Vomiting      
CHIEF COMPLAINT: sob better  + cough  no chest pain  reported no vaginal discharge with blood but reported mild intermittent hematochezia.   no diarrhea  Episodic tachycardia reported.   no more fever       PHYSICAL EXAM:  GENERAL: Thinly built, on [place] oxygen   CHEST/LUNG: No wheezing. + few crackles right side   HEART: regular and tachycardic   ABDOMEN: Soft, Nontender, Nondistended; Bowel sounds present  NERVOUS SYSTEM:  Grossly non focal.  Psychiatry: AAO x 2-3. Has dementia      OBJECTIVE DATA:   Vital Signs Last 24 Hrs  T(C): 36.9 (04 Dec 2023 11:00), Max: 37.7 (03 Dec 2023 15:36)  T(F): 98.5 (04 Dec 2023 11:00), Max: 99.9 (03 Dec 2023 15:36)  HR: 83 (04 Dec 2023 11:00) (83 - 132)  BP: 128/77 (04 Dec 2023 11:00) (128/77 - 134/82)  BP(mean): --  RR: 18 (04 Dec 2023 11:00) (16 - 18)  SpO2: 93% (04 Dec 2023 11:00) (93% - 96%)    Parameters below as of 04 Dec 2023 11:00  Patient On (Oxygen Delivery Method): room air               Daily     Daily   LABS:                        9.8    8.83  )-----------( 257      ( 03 Dec 2023 07:30 )             31.1             12-03    139  |  108  |  9   ----------------------------<  107<H>  3.8   |  28  |  0.73    Ca    8.0<L>      03 Dec 2023 07:30                  Urinalysis Basic - ( 03 Dec 2023 07:30 )    Color: x / Appearance: x / SG: x / pH: x  Gluc: 107 mg/dL / Ketone: x  / Bili: x / Urobili: x   Blood: x / Protein: x / Nitrite: x   Leuk Esterase: x / RBC: x / WBC x   Sq Epi: x / Non Sq Epi: x / Bacteria: x      Culture - Urine (collected 11-30)  Source: Clean Catch Clean Catch (Midstream)  Final Report (12-02):    <10,000 CFU/mL Normal Urogenital Rody    Culture - Blood (collected 11-30)  Source: .Blood Blood-Peripheral  Preliminary Report (12-03):    No growth at 72 Hours    Culture - Blood (collected 11-30)  Source: .Blood Blood-Peripheral  Preliminary Report (12-03):    No growth at 72 Hours      MEDICATIONS  (STANDING):  amLODIPine   Tablet 10 milliGRAM(s) Oral daily  benzonatate 100 milliGRAM(s) Oral every 8 hours  budesonide 160 MICROgram(s)/formoterol 4.5 MICROgram(s) Inhaler 2 Puff(s) Inhalation two times a day  lactobacillus acidophilus 1 Tablet(s) Oral daily  metoprolol tartrate 12.5 milliGRAM(s) Oral daily  piperacillin/tazobactam IVPB.. 3.375 Gram(s) IV Intermittent every 8 hours  sodium chloride 0.9%. 1000 milliLiter(s) (50 mL/Hr) IV Continuous <Continuous>    MEDICATIONS  (PRN):  acetaminophen     Tablet .. 650 milliGRAM(s) Oral every 6 hours PRN Temp greater or equal to 38C (100.4F), Mild Pain (1 - 3)  aluminum hydroxide/magnesium hydroxide/simethicone Suspension 30 milliLiter(s) Oral every 4 hours PRN Dyspepsia  melatonin 3 milliGRAM(s) Oral at bedtime PRN Insomnia  ondansetron Injectable 4 milliGRAM(s) IV Push every 8 hours PRN Nausea and/or Vomiting

## 2023-12-05 NOTE — PROGRESS NOTE ADULT - REASON FOR ADMISSION
Sepsis secondary to PNA

## 2023-12-05 NOTE — PROGRESS NOTE ADULT - NUTRITIONAL ASSESSMENT
This patient has been assessed with a concern for Malnutrition and has been determined to have a diagnosis/diagnoses of Severe protein-calorie malnutrition.    This patient is being managed with:   Diet Regular-  Supplement Feeding Modality:  Oral  Ensure Plus High Protein Cans or Servings Per Day:  1       Frequency:  Two Times a day  Entered: Dec  2 2023  2:54PM  

## 2023-12-06 ENCOUNTER — TRANSCRIPTION ENCOUNTER (OUTPATIENT)
Age: 88
End: 2023-12-06

## 2023-12-06 VITALS
RESPIRATION RATE: 18 BRPM | SYSTOLIC BLOOD PRESSURE: 143 MMHG | OXYGEN SATURATION: 94 % | HEART RATE: 87 BPM | TEMPERATURE: 100 F | DIASTOLIC BLOOD PRESSURE: 73 MMHG

## 2023-12-06 LAB
ANION GAP SERPL CALC-SCNC: 5 MMOL/L — SIGNIFICANT CHANGE UP (ref 5–17)
ANION GAP SERPL CALC-SCNC: 5 MMOL/L — SIGNIFICANT CHANGE UP (ref 5–17)
BUN SERPL-MCNC: 4 MG/DL — LOW (ref 7–23)
BUN SERPL-MCNC: 4 MG/DL — LOW (ref 7–23)
CALCIUM SERPL-MCNC: 8.3 MG/DL — LOW (ref 8.5–10.1)
CALCIUM SERPL-MCNC: 8.3 MG/DL — LOW (ref 8.5–10.1)
CHLORIDE SERPL-SCNC: 109 MMOL/L — HIGH (ref 96–108)
CHLORIDE SERPL-SCNC: 109 MMOL/L — HIGH (ref 96–108)
CO2 SERPL-SCNC: 27 MMOL/L — SIGNIFICANT CHANGE UP (ref 22–31)
CO2 SERPL-SCNC: 27 MMOL/L — SIGNIFICANT CHANGE UP (ref 22–31)
CREAT SERPL-MCNC: 0.63 MG/DL — SIGNIFICANT CHANGE UP (ref 0.5–1.3)
CREAT SERPL-MCNC: 0.63 MG/DL — SIGNIFICANT CHANGE UP (ref 0.5–1.3)
EGFR: 85 ML/MIN/1.73M2 — SIGNIFICANT CHANGE UP
EGFR: 85 ML/MIN/1.73M2 — SIGNIFICANT CHANGE UP
GLUCOSE SERPL-MCNC: 107 MG/DL — HIGH (ref 70–99)
GLUCOSE SERPL-MCNC: 107 MG/DL — HIGH (ref 70–99)
HCT VFR BLD CALC: 32.4 % — LOW (ref 34.5–45)
HCT VFR BLD CALC: 32.4 % — LOW (ref 34.5–45)
HGB BLD-MCNC: 10.2 G/DL — LOW (ref 11.5–15.5)
HGB BLD-MCNC: 10.2 G/DL — LOW (ref 11.5–15.5)
MCHC RBC-ENTMCNC: 26.5 PG — LOW (ref 27–34)
MCHC RBC-ENTMCNC: 26.5 PG — LOW (ref 27–34)
MCHC RBC-ENTMCNC: 31.5 G/DL — LOW (ref 32–36)
MCHC RBC-ENTMCNC: 31.5 G/DL — LOW (ref 32–36)
MCV RBC AUTO: 84.2 FL — SIGNIFICANT CHANGE UP (ref 80–100)
MCV RBC AUTO: 84.2 FL — SIGNIFICANT CHANGE UP (ref 80–100)
NRBC # BLD: 0 /100 WBCS — SIGNIFICANT CHANGE UP (ref 0–0)
NRBC # BLD: 0 /100 WBCS — SIGNIFICANT CHANGE UP (ref 0–0)
PLATELET # BLD AUTO: 428 K/UL — HIGH (ref 150–400)
PLATELET # BLD AUTO: 428 K/UL — HIGH (ref 150–400)
POTASSIUM SERPL-MCNC: 3.5 MMOL/L — SIGNIFICANT CHANGE UP (ref 3.5–5.3)
POTASSIUM SERPL-MCNC: 3.5 MMOL/L — SIGNIFICANT CHANGE UP (ref 3.5–5.3)
POTASSIUM SERPL-SCNC: 3.5 MMOL/L — SIGNIFICANT CHANGE UP (ref 3.5–5.3)
POTASSIUM SERPL-SCNC: 3.5 MMOL/L — SIGNIFICANT CHANGE UP (ref 3.5–5.3)
PROCALCITONIN SERPL-MCNC: 0.64 NG/ML — HIGH (ref 0.02–0.1)
PROCALCITONIN SERPL-MCNC: 0.64 NG/ML — HIGH (ref 0.02–0.1)
RBC # BLD: 3.85 M/UL — SIGNIFICANT CHANGE UP (ref 3.8–5.2)
RBC # BLD: 3.85 M/UL — SIGNIFICANT CHANGE UP (ref 3.8–5.2)
RBC # FLD: 16.2 % — HIGH (ref 10.3–14.5)
RBC # FLD: 16.2 % — HIGH (ref 10.3–14.5)
SODIUM SERPL-SCNC: 141 MMOL/L — SIGNIFICANT CHANGE UP (ref 135–145)
SODIUM SERPL-SCNC: 141 MMOL/L — SIGNIFICANT CHANGE UP (ref 135–145)
WBC # BLD: 9.2 K/UL — SIGNIFICANT CHANGE UP (ref 3.8–10.5)
WBC # BLD: 9.2 K/UL — SIGNIFICANT CHANGE UP (ref 3.8–10.5)
WBC # FLD AUTO: 9.2 K/UL — SIGNIFICANT CHANGE UP (ref 3.8–10.5)
WBC # FLD AUTO: 9.2 K/UL — SIGNIFICANT CHANGE UP (ref 3.8–10.5)

## 2023-12-06 PROCEDURE — 99239 HOSP IP/OBS DSCHRG MGMT >30: CPT

## 2023-12-06 RX ORDER — ALBUTEROL 90 UG/1
2 AEROSOL, METERED ORAL
Qty: 1 | Refills: 1
Start: 2023-12-06

## 2023-12-06 RX ORDER — ACETAMINOPHEN 500 MG
2 TABLET ORAL
Qty: 0 | Refills: 0 | DISCHARGE
Start: 2023-12-06

## 2023-12-06 RX ORDER — METOPROLOL TARTRATE 50 MG
0.5 TABLET ORAL
Qty: 30 | Refills: 1
Start: 2023-12-06 | End: 2024-02-03

## 2023-12-06 RX ADMIN — Medication 100 MILLIGRAM(S): at 05:45

## 2023-12-06 RX ADMIN — Medication 100 MILLIGRAM(S): at 15:54

## 2023-12-06 RX ADMIN — Medication 12.5 MILLIGRAM(S): at 05:46

## 2023-12-06 RX ADMIN — PIPERACILLIN AND TAZOBACTAM 25 GRAM(S): 4; .5 INJECTION, POWDER, LYOPHILIZED, FOR SOLUTION INTRAVENOUS at 05:45

## 2023-12-06 RX ADMIN — PIPERACILLIN AND TAZOBACTAM 25 GRAM(S): 4; .5 INJECTION, POWDER, LYOPHILIZED, FOR SOLUTION INTRAVENOUS at 15:00

## 2023-12-06 RX ADMIN — BUDESONIDE AND FORMOTEROL FUMARATE DIHYDRATE 2 PUFF(S): 160; 4.5 AEROSOL RESPIRATORY (INHALATION) at 05:47

## 2023-12-06 RX ADMIN — Medication 1 TABLET(S): at 12:53

## 2023-12-06 RX ADMIN — AMLODIPINE BESYLATE 10 MILLIGRAM(S): 2.5 TABLET ORAL at 05:46

## 2023-12-06 NOTE — DISCHARGE NOTE NURSING/CASE MANAGEMENT/SOCIAL WORK - PATIENT PORTAL LINK FT
You can access the FollowMyHealth Patient Portal offered by [facility] by registering at the following website: [email or web address]. By joining [employer]’s FollowMyHealth portal, you will also be able to view your health information using other applications (apps) compatible with our system. You can access the FollowMyHealth Patient Portal offered by [facility] by registering at the following website: [email or web address]. By joining [employer]’s FollowMyHealth portal, you will also be able to view your health information using other applications (apps) compatible with our system.

## 2023-12-06 NOTE — DISCHARGE NOTE PROVIDER - DETAILS OF MALNUTRITION DIAGNOSIS/DIAGNOSES
This patient has been assessed with a concern for Malnutrition and was treated during this hospitalization for the following Nutrition diagnosis/diagnoses:     -  12/02/2023: Severe protein-calorie malnutrition

## 2023-12-06 NOTE — DISCHARGE NOTE PROVIDER - HOSPITAL COURSE
88 year old female with PMHx of HTN and asthma who presented to the ED on 11/30/23 for complaints of shortness of breath and cough. Initial work up positive for community acquired pneumonia on cxr [id number] wbc 16 procalcitonin  6.7 lactic acid 3.3 , ct angiogram chest performed on 12/2 notable for right mid lobe and right lower lobe opacities c/w pneumonia ,Patient relieved 6 days iv antibiotics , pulmonary was consulted and suggested pt clear for discharge after receiving full course of antibiotics , hospital course significant for prlvic ultrasound which was limited by pt refusal of transvaginal exam positive for 10mm endometrium . patient will benefit from outpatient repeat ct scan in 3 months to ensure resolution of pulmonary opacities . pt counselled to bring this document with her to her next primary care visit and show it to to her primary doctor so repeat ct anc fu for 10mm endometrium can be arraigned     pneumonia -s/p 6 day iv abx -please follow up with your primary doctor to arrange  follow up ct scan chest   lactic acidosis -resolved  asthma -continue Symbicort  elevated wbc count -resolved   hypokalemia -supplemented /resolved  10 mm endometrium -follow up with your primary care doctor   elevated vit b 12 levels-repeat labs and continued follow up with avis primary care doctor       ACC: [id number] EXAM:  US PELVIC COMPLETE   ORDERED BY: MANNY TERRY     PROCEDURE DATE:  12/03/2023    INTERPRETATION:  CLINICAL INFORMATION: Postmenopausal bleeding.    IMPRESSION:  Limited transabdominal exam. The patient declined the transvaginal   portion.  10 mm thickened endometrium.  Otherwise, liver pelvic sonogram.    ACC: [id number] EXAM:  CT ANGIO CHEST PULM ART WAWIC   ORDERED BY: MANNY TERRY     PROCEDURE DATE:  12/02/2023      IMPRESSION: No pulmonary embolus is noted.    Patchy opacities/consolidation are noted in the right middle and right   lower lobes. Follow-up CT scan is recommended in 3 months to ensure   complete resolution.               88 year old female with PMHx of HTN and asthma who presented to the ED on 11/30/23 for complaints of shortness of breath and cough. Initial work up positive for community acquired pneumonia on cxr [id number] wbc 16 procalcitonin  6.7 lactic acid 3.3 , ct angiogram chest performed on 12/2 notable for right mid lobe and right lower lobe opacities c/w pneumonia ,Patient relieved 6 days iv antibiotics , pulmonary was consulted and suggested pt clear for discharge after receiving full course of antibiotics , hospital course significant for prlvic ultrasound which was limited by pt refusal of transvaginal exam positive for 10mm endometrium . patient will benefit from outpatient repeat ct scan in 3 months to ensure resolution of pulmonary opacities . pt counselled to bring this document with her to her next primary care visit and show it to to her primary doctor so repeat ct anc fu for 10mm endometrium can be arraigned     pneumonia -s/p 6 day iv abx -please follow up with your primary doctor to arrange  follow up ct scan chest   lactic acidosis -resolved  asthma -continue Symbicort  elevated wbc count -resolved   hypokalemia -supplemented /resolved  10 mm endometrium -follow up with your primary care doctor   elevated vit b 12 levels-repeat labs and continued follow up with [facility]is primary care doctor       ACC: [id number] EXAM:  US PELVIC COMPLETE   ORDERED BY: MANNY TERRY     PROCEDURE DATE:  12/03/2023    INTERPRETATION:  CLINICAL INFORMATION: Postmenopausal bleeding.    IMPRESSION:  Limited transabdominal exam. The patient declined the transvaginal   portion.  10 mm thickened endometrium.  Otherwise, liver pelvic sonogram.    ACC: [id number] EXAM:  CT ANGIO CHEST PULM ART WAWIC   ORDERED BY: MANNY TERRY     PROCEDURE DATE:  12/02/2023      IMPRESSION: No pulmonary embolus is noted.    Patchy opacities/consolidation are noted in the right middle and right   lower lobes. Follow-up CT scan is recommended in 3 months to ensure   complete resolution.

## 2023-12-06 NOTE — DISCHARGE NOTE NURSING/CASE MANAGEMENT/SOCIAL WORK - NSDCPEFALRISK_GEN_ALL_CORE
For information on Fall & Injury Prevention, visit: [email or web address] OR  [email or web address] OR  https://www.cdc.gov/steadi/patient.html For information on Fall & Injury Prevention, visit: [email or web address]://[email or web address]/[email or web address]-[email or web address]-mobility OR  https://[email or web address]/[email or web address]-tips-to-avoid-injury OR  https://www.cdc.gov/steadi/patient.html

## 2023-12-06 NOTE — DISCHARGE NOTE PROVIDER - CARE PROVIDER_API CALL
Dwayne Porras  Internal Medicine  [address] [address]  Phone: [phone]  Fax: [phone]  Follow Up Time:    Dwayne Porras  Internal Medicine  [address] [address]  Phone: [phone]  Fax: [phone]  Follow Up Time:

## 2023-12-06 NOTE — DISCHARGE NOTE PROVIDER - NSDCFUADDINST_GEN_ALL_CORE_FT
What Type Of Note Output Would You Prefer (Optional)?: Bullet Format How Severe Are Your Spot(S)?: mild Have Your Spot(S) Been Treated In The Past?: has not been treated Hpi Title: Evaluation of Skin Lesions Family Member: Sister ensure 1 can daily or similar nutrient drink at home

## 2023-12-06 NOTE — DISCHARGE NOTE PROVIDER - NSDCMRMEDTOKEN_GEN_ALL_CORE_FT
You should resume your previous diet unless otherwise instructed by your doctor. Do not drink alcoholic beverages for the next 24 hours. acetaminophen 325 mg oral tablet: 2 tab(s) orally every 6 hours As needed Temp greater or equal to 38C (100.4F), Mild Pain (1 - 3)  amLODIPine 10 mg oral tablet: 1 tab(s) orally once a day  budesonide-formoterol 160 mcg-4.5 mcg/inh inhalation aerosol: 2 puff(s) inhaled 2 times a day

## 2023-12-06 NOTE — DISCHARGE NOTE PROVIDER - NSDCCPCAREPLAN_GEN_ALL_CORE_FT
PRINCIPAL DISCHARGE DIAGNOSIS  Diagnosis: Pneumonia  Assessment and Plan of Treatment: s/p full course iv abx      SECONDARY DISCHARGE DIAGNOSES  Diagnosis: Hypokalemia  Assessment and Plan of Treatment: resolved

## 2023-12-07 ENCOUNTER — TRANSCRIPTION ENCOUNTER (OUTPATIENT)
Age: 88
End: 2023-12-07

## 2023-12-08 ENCOUNTER — APPOINTMENT (OUTPATIENT)
Age: 88
End: 2023-12-08
Payer: MEDICARE

## 2023-12-08 DIAGNOSIS — R63.4 ABNORMAL WEIGHT LOSS: ICD-10-CM

## 2023-12-08 DIAGNOSIS — J45.909 UNSPECIFIED ASTHMA, UNCOMPLICATED: ICD-10-CM

## 2023-12-08 DIAGNOSIS — R19.5 OTHER FECAL ABNORMALITIES: ICD-10-CM

## 2023-12-08 PROBLEM — I77.1 STRICTURE OF ARTERY: Chronic | Status: ACTIVE | Noted: 2023-11-30

## 2023-12-08 PROBLEM — I10 ESSENTIAL (PRIMARY) HYPERTENSION: Chronic | Status: ACTIVE | Noted: 2023-11-30

## 2023-12-08 PROCEDURE — 99349 HOME/RES VST EST MOD MDM 40: CPT | Mod: 95

## 2023-12-08 RX ORDER — LOPERAMIDE HCL 2 MG
1 TABLET ORAL
Qty: 90 | Refills: 0
Start: 2023-12-08 | End: 2024-01-06

## 2023-12-08 RX ORDER — BUDESONIDE AND FORMOTEROL FUMARATE DIHYDRATE 160; 4.5 UG/1; UG/1
160-4.5 AEROSOL RESPIRATORY (INHALATION)
Refills: 0 | Status: ACTIVE | COMMUNITY

## 2023-12-08 RX ORDER — METOPROLOL TARTRATE 25 MG/1
25 TABLET, FILM COATED ORAL TWICE DAILY
Refills: 0 | Status: ACTIVE | COMMUNITY

## 2023-12-08 RX ORDER — AMLODIPINE BESYLATE 10 MG/1
10 TABLET ORAL
Refills: 0 | Status: ACTIVE | COMMUNITY

## 2023-12-08 RX ORDER — ALBUTEROL SULFATE 90 UG/1
108 (90 BASE) INHALANT RESPIRATORY (INHALATION)
Refills: 0 | Status: ACTIVE | COMMUNITY

## 2023-12-10 DIAGNOSIS — I71.23 ANEURYSM OF THE DESCENDING THORACIC AORTA, WITHOUT RUPTURE: ICD-10-CM

## 2023-12-10 DIAGNOSIS — D64.9 ANEMIA, UNSPECIFIED: ICD-10-CM

## 2023-12-10 DIAGNOSIS — E87.6 HYPOKALEMIA: ICD-10-CM

## 2023-12-10 DIAGNOSIS — I10 ESSENTIAL (PRIMARY) HYPERTENSION: ICD-10-CM

## 2023-12-10 DIAGNOSIS — R91.8 OTHER NONSPECIFIC ABNORMAL FINDING OF LUNG FIELD: ICD-10-CM

## 2023-12-10 DIAGNOSIS — A41.9 SEPSIS, UNSPECIFIED ORGANISM: ICD-10-CM

## 2023-12-10 DIAGNOSIS — J18.9 PNEUMONIA, UNSPECIFIED ORGANISM: ICD-10-CM

## 2023-12-10 DIAGNOSIS — Z66 DO NOT RESUSCITATE: ICD-10-CM

## 2023-12-10 DIAGNOSIS — J45.909 UNSPECIFIED ASTHMA, UNCOMPLICATED: ICD-10-CM

## 2023-12-10 DIAGNOSIS — N95.0 POSTMENOPAUSAL BLEEDING: ICD-10-CM

## 2023-12-10 DIAGNOSIS — E87.20 ACIDOSIS, UNSPECIFIED: ICD-10-CM

## 2023-12-10 DIAGNOSIS — E43 UNSPECIFIED SEVERE PROTEIN-CALORIE MALNUTRITION: ICD-10-CM

## 2023-12-10 DIAGNOSIS — R06.02 SHORTNESS OF BREATH: ICD-10-CM

## 2023-12-13 ENCOUNTER — NON-APPOINTMENT (OUTPATIENT)
Age: 88
End: 2023-12-13

## 2023-12-14 ENCOUNTER — APPOINTMENT (OUTPATIENT)
Age: 88
End: 2023-12-14
Payer: MEDICARE

## 2023-12-14 ENCOUNTER — TRANSCRIPTION ENCOUNTER (OUTPATIENT)
Age: 88
End: 2023-12-14

## 2023-12-14 VITALS
HEART RATE: 75 BPM | RESPIRATION RATE: 15 BRPM | SYSTOLIC BLOOD PRESSURE: 118 MMHG | OXYGEN SATURATION: 96 % | TEMPERATURE: 97.3 F | DIASTOLIC BLOOD PRESSURE: 66 MMHG

## 2023-12-14 DIAGNOSIS — Z87.898 PERSONAL HISTORY OF OTHER SPECIFIED CONDITIONS: ICD-10-CM

## 2023-12-14 DIAGNOSIS — R53.83 OTHER MALAISE: ICD-10-CM

## 2023-12-14 DIAGNOSIS — R53.81 OTHER MALAISE: ICD-10-CM

## 2023-12-14 DIAGNOSIS — I10 ESSENTIAL (PRIMARY) HYPERTENSION: ICD-10-CM

## 2023-12-14 DIAGNOSIS — J18.9 PNEUMONIA, UNSPECIFIED ORGANISM: ICD-10-CM

## 2023-12-14 PROCEDURE — 99497 ADVNCD CARE PLAN 30 MIN: CPT

## 2023-12-14 PROCEDURE — 99349 HOME/RES VST EST MOD MDM 40: CPT

## 2023-12-14 RX ORDER — BENZONATATE 150 MG/1
CAPSULE ORAL
Refills: 0 | Status: ACTIVE | COMMUNITY

## 2023-12-14 NOTE — COUNSELING
[Fall prevention counseling provided] : Fall prevention counseling provided [Adequate lighting] : Adequate lighting [No throw rugs] : No throw rugs [Use proper foot wear] : Use proper foot wear [Engage in a relaxing activity] : Engage in a relaxing activity

## 2023-12-15 NOTE — ASSESSMENT
[FreeTextEntry1] : Mrs. Claudio is a 87 y/o woman with pmhx of HTN, carcinoid tumor, aneurysm, prolapsed uterus, and asthma with a recent hospitalization for sepsis/pneumonia. She appears more fatigued since the last visit on 12/8/23. Unable to obtain an accurate visual of tonsils during assessment.  Borderline orthostatic. Sp02 levels maintained >94% at rest and ambulation. Concerned with the patient heading out of town she should be checked for further infections such as strep and/or bloodwork drawn. Rule out infection vs slow recovery vs medication intolerance.   #Pneumonia -s/p antibiotic course -stagger activity with periods of rest -OOB to chair -hydrate within cardiac tolerance  #htn -currently on amlodipine and metoprolol -unclear if malaise is due to infection or newly prescribed BB -hold for today -slow postural changes -continue to monitor blood pressure before taking blood pressure medications  #malaise and fatigue -f/u with urgent care today at 415 to r/u infectious disease; exam limited in home -stagger activity with periods of rest -adequate nutrition and hydration   -

## 2023-12-15 NOTE — HEALTH RISK ASSESSMENT
[Aggressive treatment] : aggressive treatment [Patient/Caregiver unclear of wishes] : , patient/caregiver unclear of wishes [Last Verification Date: ___] : Last Verification Date: [unfilled] [I will adhere to the patient's wishes.] : I will adhere to the patient's wishes. [Time Spent: ___ minutes] : Time Spent: [unfilled] minutes [Never] : Never [AdvancecareDate] : 12/14/23 [FreeTextEntry4] : The patient completed MOLST form during index hospitalization. Reviewed MOLST with the patient and it is unclear of her wishes. MOLST form was voided and no new was created yet.

## 2023-12-15 NOTE — HISTORY OF PRESENT ILLNESS
[Family Member] : family member [FreeTextEntry1] : F/u post-hospitalization home visit [de-identified] : Recent hospitalization at [facility] for sepsis 2/2 pneumonia from 11/30/23 - 12/6/23. Telehealth visit on 12/8/23: "Mrs. Claudio was seen today via telecommunication video. Her daughter, Claudine was present during the visit. Reports feeling weak and tired, more so than usual, which is attributed to recent illness and hospitalization. Cough is present but minimal with clear sputum, not affecting sleep. No fever, chills, or dysuria was reported. Urine is light in color. Bowel movements are loose, likely secondary to recent antibiotic use/infection. The patient has a poor appetite and has experienced unintentional weight loss. The patient is also on Ensure for nutritional supplementation. No new acute symptoms were reported. Currently, no active management by a pulmonologist. Recent changes in medication include the addition of metoprolol for blood pressure control. No recent surgical history aside from knee replacement and treatment for carcinoid tumor."  The patient was seen in the home today. The daughter and spouse were present during the home visit. Currently, she reports feeling worse since hospitalization. She woke up this morning and felt dizzy and SBP in the 90s. She reports that she has a pain in her throat when she swallows for the past few days. She was able to eat her breakfast this morning but her appetite remains poor. Denies shortness of breath, fever/chills, N/V/D, chest pain, and/or dysuria. She did not take the morning dose of antihypertensive medications. Of note, unable to obtain clear answers of current symptoms due to vagueness in answers.

## 2023-12-15 NOTE — REVIEW OF SYSTEMS
[Fatigue] : fatigue [Recent Change In Weight] : ~T recent weight change [Sore Throat] : sore throat [Cough] : cough [Muscle Weakness] : muscle weakness [Memory Loss] : memory loss [Negative] : Heme/Lymph [Fever] : no fever [Hoarseness] : no hoarseness [Postnasal Drip] : no postnasal drip

## 2023-12-15 NOTE — PHYSICAL EXAM
[Looks Tired] : appears tired [Normal Sclera/Conjunctiva] : normal sclera/conjunctiva [PERRL] : pupils equal round and reactive to light [EOMI] : extraocular movements intact [No JVD] : no jugular venous distention [Supple] : supple [No Respiratory Distress] : no respiratory distress  [No Accessory Muscle Use] : no accessory muscle use [Clear to Auscultation] : lungs were clear to auscultation bilaterally [Normal Rate] : normal rate  [Regular Rhythm] : with a regular rhythm [Normal S1, S2] : normal S1 and S2 [Pedal Pulses Present] : the pedal pulses are present [No Edema] : there was no peripheral edema [No Extremity Clubbing/Cyanosis] : no extremity clubbing/cyanosis [Soft] : abdomen soft [Non Tender] : non-tender [Normal Bowel Sounds] : normal bowel sounds [No Rash] : no rash [Normal Affect] : the affect was normal [Alert and Oriented x3] : oriented to person, place, and time [Normal Insight/Judgement] : insight and judgment were intact [de-identified] : unable to visualize tonsils.

## 2023-12-15 NOTE — PLAN
[FreeTextEntry1] : [facility] urgent care appointment today  Patient/family was informed about NP's role/ STARS program and overview of transitional care reviewed with patient. Patient/family educated on topics of importance such as compliance with all provider visits, prescribed medication regimen, and low salt / heart healthy diet. Patient/Family encouraged calling NP with any issues, concerns or questions, also educated to notify NP if experiencing CP, SOB , cough, increased mucus/phlegm production, abdominal discomfort/swelling, difficulty sleeping or lying flat, fever, chills, fatigue, weight gain of 2-3lbs in 24 hours or 5lbs in one week, dizziness, lightheadedness, n/v/d/c, swelling to extremities and/or any c/o or concerns. Reassurance provided.

## 2023-12-21 ENCOUNTER — TRANSCRIPTION ENCOUNTER (OUTPATIENT)
Age: 88
End: 2023-12-21

## 2024-03-22 ENCOUNTER — EMERGENCY (EMERGENCY)
Facility: HOSPITAL | Age: 89
LOS: 0 days | Discharge: LEFT BEFORE DISCHARGE | End: 2024-03-23
Attending: STUDENT IN AN ORGANIZED HEALTH CARE EDUCATION/TRAINING PROGRAM
Payer: MEDICARE

## 2024-03-22 VITALS
TEMPERATURE: 99 F | SYSTOLIC BLOOD PRESSURE: 147 MMHG | RESPIRATION RATE: 17 BRPM | HEIGHT: 62 IN | OXYGEN SATURATION: 95 % | DIASTOLIC BLOOD PRESSURE: 77 MMHG | WEIGHT: 108.03 LBS | HEART RATE: 86 BPM

## 2024-03-22 DIAGNOSIS — R06.02 SHORTNESS OF BREATH: ICD-10-CM

## 2024-03-22 DIAGNOSIS — J45.901 UNSPECIFIED ASTHMA WITH (ACUTE) EXACERBATION: ICD-10-CM

## 2024-03-22 DIAGNOSIS — R06.2 WHEEZING: ICD-10-CM

## 2024-03-22 DIAGNOSIS — Z20.822 CONTACT WITH AND (SUSPECTED) EXPOSURE TO COVID-19: ICD-10-CM

## 2024-03-22 DIAGNOSIS — R05.9 COUGH, UNSPECIFIED: ICD-10-CM

## 2024-03-22 DIAGNOSIS — Z53.29 PROCEDURE AND TREATMENT NOT CARRIED OUT BECAUSE OF PATIENT'S DECISION FOR OTHER REASONS: ICD-10-CM

## 2024-03-22 DIAGNOSIS — I10 ESSENTIAL (PRIMARY) HYPERTENSION: ICD-10-CM

## 2024-03-22 LAB
ALBUMIN SERPL ELPH-MCNC: 2.9 G/DL — LOW (ref 3.3–5)
ALP SERPL-CCNC: 119 U/L — SIGNIFICANT CHANGE UP (ref 40–120)
ALT FLD-CCNC: 18 U/L — SIGNIFICANT CHANGE UP (ref 12–78)
ANION GAP SERPL CALC-SCNC: 6 MMOL/L — SIGNIFICANT CHANGE UP (ref 5–17)
AST SERPL-CCNC: 29 U/L — SIGNIFICANT CHANGE UP (ref 15–37)
BASOPHILS # BLD AUTO: 0.04 K/UL — SIGNIFICANT CHANGE UP (ref 0–0.2)
BASOPHILS NFR BLD AUTO: 0.5 % — SIGNIFICANT CHANGE UP (ref 0–2)
BILIRUB SERPL-MCNC: 0.2 MG/DL — SIGNIFICANT CHANGE UP (ref 0.2–1.2)
BUN SERPL-MCNC: 18 MG/DL — SIGNIFICANT CHANGE UP (ref 7–23)
CALCIUM SERPL-MCNC: 9.1 MG/DL — SIGNIFICANT CHANGE UP (ref 8.5–10.1)
CHLORIDE SERPL-SCNC: 102 MMOL/L — SIGNIFICANT CHANGE UP (ref 96–108)
CO2 SERPL-SCNC: 28 MMOL/L — SIGNIFICANT CHANGE UP (ref 22–31)
CREAT SERPL-MCNC: 0.9 MG/DL — SIGNIFICANT CHANGE UP (ref 0.5–1.3)
EGFR: 61 ML/MIN/1.73M2 — SIGNIFICANT CHANGE UP
EOSINOPHIL # BLD AUTO: 1.54 K/UL — HIGH (ref 0–0.5)
EOSINOPHIL NFR BLD AUTO: 19.5 % — HIGH (ref 0–6)
FLUAV AG NPH QL: SIGNIFICANT CHANGE UP
FLUBV AG NPH QL: SIGNIFICANT CHANGE UP
GLUCOSE SERPL-MCNC: 94 MG/DL — SIGNIFICANT CHANGE UP (ref 70–99)
HCT VFR BLD CALC: 34.4 % — LOW (ref 34.5–45)
HGB BLD-MCNC: 11 G/DL — LOW (ref 11.5–15.5)
IMM GRANULOCYTES NFR BLD AUTO: 0.4 % — SIGNIFICANT CHANGE UP (ref 0–0.9)
LYMPHOCYTES # BLD AUTO: 1.07 K/UL — SIGNIFICANT CHANGE UP (ref 1–3.3)
LYMPHOCYTES # BLD AUTO: 13.6 % — SIGNIFICANT CHANGE UP (ref 13–44)
MCHC RBC-ENTMCNC: 26.6 PG — LOW (ref 27–34)
MCHC RBC-ENTMCNC: 32 G/DL — SIGNIFICANT CHANGE UP (ref 32–36)
MCV RBC AUTO: 83.3 FL — SIGNIFICANT CHANGE UP (ref 80–100)
MONOCYTES # BLD AUTO: 0.47 K/UL — SIGNIFICANT CHANGE UP (ref 0–0.9)
MONOCYTES NFR BLD AUTO: 6 % — SIGNIFICANT CHANGE UP (ref 2–14)
NEUTROPHILS # BLD AUTO: 4.74 K/UL — SIGNIFICANT CHANGE UP (ref 1.8–7.4)
NEUTROPHILS NFR BLD AUTO: 60 % — SIGNIFICANT CHANGE UP (ref 43–77)
NRBC # BLD: 0 /100 WBCS — SIGNIFICANT CHANGE UP (ref 0–0)
PLATELET # BLD AUTO: 244 K/UL — SIGNIFICANT CHANGE UP (ref 150–400)
POTASSIUM SERPL-MCNC: 4.2 MMOL/L — SIGNIFICANT CHANGE UP (ref 3.5–5.3)
POTASSIUM SERPL-SCNC: 4.2 MMOL/L — SIGNIFICANT CHANGE UP (ref 3.5–5.3)
PROT SERPL-MCNC: 8 GM/DL — SIGNIFICANT CHANGE UP (ref 6–8.3)
RBC # BLD: 4.13 M/UL — SIGNIFICANT CHANGE UP (ref 3.8–5.2)
RBC # FLD: 15.6 % — HIGH (ref 10.3–14.5)
SARS-COV-2 RNA SPEC QL NAA+PROBE: SIGNIFICANT CHANGE UP
SODIUM SERPL-SCNC: 136 MMOL/L — SIGNIFICANT CHANGE UP (ref 135–145)
WBC # BLD: 7.89 K/UL — SIGNIFICANT CHANGE UP (ref 3.8–10.5)
WBC # FLD AUTO: 7.89 K/UL — SIGNIFICANT CHANGE UP (ref 3.8–10.5)

## 2024-03-22 PROCEDURE — 99285 EMERGENCY DEPT VISIT HI MDM: CPT

## 2024-03-22 PROCEDURE — 71045 X-RAY EXAM CHEST 1 VIEW: CPT | Mod: 26

## 2024-03-22 RX ORDER — BUDESONIDE AND FORMOTEROL FUMARATE DIHYDRATE 160; 4.5 UG/1; UG/1
2 AEROSOL RESPIRATORY (INHALATION)
Refills: 0 | DISCHARGE

## 2024-03-22 RX ORDER — IPRATROPIUM/ALBUTEROL SULFATE 18-103MCG
3 AEROSOL WITH ADAPTER (GRAM) INHALATION
Refills: 0 | Status: COMPLETED | OUTPATIENT
Start: 2024-03-22 | End: 2024-03-22

## 2024-03-22 RX ORDER — MAGNESIUM SULFATE 500 MG/ML
2 VIAL (ML) INJECTION ONCE
Refills: 0 | Status: COMPLETED | OUTPATIENT
Start: 2024-03-22 | End: 2024-03-22

## 2024-03-22 RX ORDER — AMLODIPINE BESYLATE 2.5 MG/1
1 TABLET ORAL
Refills: 0 | DISCHARGE

## 2024-03-22 RX ADMIN — Medication 150 GRAM(S): at 23:08

## 2024-03-22 RX ADMIN — Medication 3 MILLILITER(S): at 19:57

## 2024-03-22 RX ADMIN — Medication 125 MILLIGRAM(S): at 19:42

## 2024-03-22 RX ADMIN — Medication 3 MILLILITER(S): at 19:42

## 2024-03-22 RX ADMIN — Medication 3 MILLILITER(S): at 19:45

## 2024-03-22 NOTE — ED ADULT TRIAGE NOTE - CHIEF COMPLAINT QUOTE
Patient presents to ED c/o difficulty breathing and productive coughing x 2 weeks. Patient was recently evaluated for mass on lung.  hx PNA, asthma, HTN

## 2024-03-22 NOTE — ED PROVIDER NOTE - PROVIDER TOKENS
PROVIDER:[TOKEN:[[id number]],FOLLOWUP:[Urgent]],PROVIDER:[TOKEN:[[id number]],FOLLOWUP:[Urgent]]

## 2024-03-22 NOTE — ED PROVIDER NOTE - CARE PROVIDERS DIRECT ADDRESSES
DISCHARGE [email or web address]

## 2024-03-22 NOTE — ED PROVIDER NOTE - PATIENT PORTAL LINK FT
You can access the FollowMyHealth Patient Portal offered by [facility] by registering at the following website: [email or web address]. By joining [employer]’s FollowMyHealth portal, you will also be able to view your health information using other applications (apps) compatible with our system.

## 2024-03-22 NOTE — ED ADULT TRIAGE NOTE - ISOLATION TYPE:
Called pt. to remind of appointment on 1/9/2024 at 9:00 am with Dr. Falcon. Pt answered and confirmed  appointment.   None

## 2024-03-22 NOTE — ED ADULT NURSE NOTE - NSFALLHARMRISKINTERV_ED_ALL_ED

## 2024-03-22 NOTE — ED ADULT TRIAGE NOTE - GLASGOW COMA SCALE: SCORE, MLM
[address] Jaylyn Billy)  Orthopaedic Surgery Surgery  [address] [place] [address]  Phone: [phone]  Fax: [phone]  Follow Up Time:

## 2024-03-22 NOTE — ED PROVIDER NOTE - CARE PROVIDER_API CALL
Phil Nolan  Pulmonary Disease  [address] [place] [address]  Phone: [phone]  Fax: [phone]  Follow Up Time: Urgent    Qamar Serna  Internal Medicine  [address] [place] [address]  Phone: [phone]  Fax: [phone]  Follow Up Time: Urgent

## 2024-03-22 NOTE — ED PROVIDER NOTE - CLINICAL SUMMARY MEDICAL DECISION MAKING FREE TEXT BOX
88 year old female with h/o HTN and asthma presents today accompanied with her son c/o c 88 year old female with h/o HTN and asthma presents today accompanied with her son c/o sob, cough and wheezing x 1 week 88 year old female with h/o HTN and asthma presents today accompanied with her son c/o sob, cough and wheezing x 2 weeks, per her son who is at the bedside, pt was admitted in December at [facility] for pneumonia, ct recommended three month follow up, yesterday pt was sent by her pmd for ct with iv contrast but results are unknown, since her admission for pna pt has had intermittent episodes of  wheezing, she denies prior h/o asthma and referred by her pmd to a pulmonologist but was sent for a ct chest first. On exam pt is alert, awake and oriented x 3, well appearing, nontoxic/nonlethargic appearing and in no respiratory distress, pt noted to have diffuse wheezing, o2 sat on RA is 95%, rest of her exam is benign. Differential diagnosis includes but not limited to viral vs bacterial illness, pna, reactive airway, chf. Labs ordered, duonebs/steroids, swab and cxr, Magnesium is wheezing persists, will reassess and dispo 88 year old female with h/o HTN and asthma presents today accompanied with her son c/o sob, cough and wheezing x 2 weeks, per her son who is at the bedside, pt was admitted in December at [facility] for pneumonia, ct recommended three month follow up, yesterday pt was sent by her pmd for ct with iv contrast but results are unknown, since her admission for pna pt has had intermittent episodes of  wheezing, she denies prior h/o asthma and referred by her pmd to a pulmonologist but was sent for a ct chest first. On exam pt is alert, awake and oriented x 3, well appearing, nontoxic/nonlethargic appearing and in no respiratory distress, pt noted to have diffuse wheezing, o2 sat on RA is 95%, rest of her exam is benign. Differential diagnosis includes but not limited to viral vs bacterial illness, pna, reactive airway, chf. Labs ordered, duonebs/steroids, swab and cxr, Magnesium is wheezing persists, will reassess and dispo    pt continues to wheeze despite all medications given, pt is refusing to stay for admission, wants to follow up with her pmd  The patient has decided to leave against medical advice.  The patient is AAOx3, not intoxicated, and displays normal decision making ability. We discussed all risks, benefits, and alternatives to the progression of treatment and the potential dangers of leaving including but not limited to permanent disability, injury, and death.  The patient was instructed that they are welcome to change their decision to leave against medical advice and return to the emergency department at any time and for any reason in order to allow us to render care.

## 2024-03-22 NOTE — ED ADULT NURSE NOTE - OBJECTIVE STATEMENT
Patient BIB son for dyspnea since december, CT scan repeated o/p yesterday. Patient reports productive cough since december, denies fever/chills.

## 2024-03-22 NOTE — ED PROVIDER NOTE - PROGRESS NOTE DETAILS
pt continues to wheeze despite duonebs and magnesium, one more treatment given Results reported to patient--grossly benign, xr clear, labs unremarkable   Pt. reports feeling better after meds, still wheezing and sob with exertion, but understands that she is leaving AMA in spite of recommendation by Dr. Loomis and I to stay for treatment   pt. agrees to f/u with primary care outpt., additional pulm referral given for f/u   pt. understands to return to ED if symptoms worsen; will d/c AMA, paper signed in chart by Dr. Loomis and pt. The patient has decided to leave against medical advice.  The patient is AAOx3, not intoxicated, and displays normal decision making ability. We discussed all risks, benefits, and alternatives to the progression of treatment and the potential dangers of leaving including but not limited to permanent disability, injury, and death.  The patient was instructed that they are welcome to change their decision to leave against medical advice and return to the emergency department at any time and for any reason in order to allow us to render care. Results reported to patient--grossly benign, xr clear (L lung mass is known by family and pt. is following outpt., had CT recently to better evaluate), labs unremarkable   Pt. reports feeling better after meds, still wheezing and sob with exertion, but understands that she is leaving AMA in spite of recommendation by Dr. Loomis and I to stay for treatment   pt. agrees to f/u with primary care outpt., additional pulm referral given for f/u   pt. understands to return to ED if symptoms worsen; will d/c AMA, paper signed in chart by Dr. Loomis and pt.

## 2024-03-23 VITALS
DIASTOLIC BLOOD PRESSURE: 82 MMHG | SYSTOLIC BLOOD PRESSURE: 130 MMHG | RESPIRATION RATE: 18 BRPM | HEART RATE: 82 BPM | OXYGEN SATURATION: 96 %

## 2024-03-23 RX ORDER — IPRATROPIUM/ALBUTEROL SULFATE 18-103MCG
3 AEROSOL WITH ADAPTER (GRAM) INHALATION ONCE
Refills: 0 | Status: COMPLETED | OUTPATIENT
Start: 2024-03-23 | End: 2024-03-23

## 2024-03-23 RX ORDER — ALBUTEROL 90 UG/1
2 AEROSOL, METERED ORAL
Qty: 1 | Refills: 0
Start: 2024-03-23 | End: 2024-04-21

## 2024-03-23 RX ADMIN — Medication 3 MILLILITER(S): at 01:03

## 2024-03-23 NOTE — ED ADULT NURSE REASSESSMENT NOTE - NS ED NURSE REASSESS COMMENT FT1
Pt AOx3 with steady gait. Pt reports no acute distress at this time. Pt requests to sign out AMA. Pt educated on the importance of staying in the ED. MD at the beside. Pt still wants to sign out AMA.

## 2024-06-13 NOTE — ED ADULT NURSE NOTE - CADM POA URETHRAL CATHETER
No
Follow up with your primary care doctor   return to ED for worsening bleeding, dizziness, nausea, vomiting

## 2024-12-10 ENCOUNTER — NON-APPOINTMENT (OUTPATIENT)
Age: 88
End: 2024-12-10

## 2024-12-11 ENCOUNTER — EMERGENCY (EMERGENCY)
Facility: HOSPITAL | Age: 88
LOS: 0 days | Discharge: HOME | End: 2024-12-11
Attending: EMERGENCY MEDICINE
Payer: MEDICARE

## 2024-12-11 VITALS
RESPIRATION RATE: 17 BRPM | WEIGHT: 111.99 LBS | SYSTOLIC BLOOD PRESSURE: 128 MMHG | DIASTOLIC BLOOD PRESSURE: 78 MMHG | HEIGHT: 63.78 IN | HEART RATE: 75 BPM | OXYGEN SATURATION: 100 % | TEMPERATURE: 98 F

## 2024-12-11 VITALS
SYSTOLIC BLOOD PRESSURE: 126 MMHG | HEART RATE: 78 BPM | DIASTOLIC BLOOD PRESSURE: 73 MMHG | RESPIRATION RATE: 17 BRPM | TEMPERATURE: 98 F | OXYGEN SATURATION: 96 %
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
## 2024-12-11 DIAGNOSIS — M70.32 OTHER BURSITIS OF ELBOW, LEFT ELBOW: ICD-10-CM
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
## 2024-12-11 DIAGNOSIS — I10 ESSENTIAL (PRIMARY) HYPERTENSION: ICD-10-CM

## 2024-12-11 PROCEDURE — 73070 X-RAY EXAM OF ELBOW: CPT | Mod: 26,LT

## 2024-12-11 PROCEDURE — 99284 EMERGENCY DEPT VISIT MOD MDM: CPT | Mod: FS

## 2024-12-11 NOTE — ED ADULT NURSE NOTE - NSFALLUNIVINTERV_ED_ALL_ED
Bed/Stretcher in lowest position, wheels locked, appropriate side rails in place/Call bell, personal items and telephone in reach/Instruct patient to call for assistance before getting out of bed/chair/stretcher/Non-slip footwear applied when patient is off stretcher/[place] to call system/Physically safe environment - no spills, clutter or unnecessary equipment/Purposeful proactive rounding/Room/bathroom lighting operational, light cord in reach

## 2024-12-11 NOTE — ED ADULT NURSE NOTE - DRUG PRE-SCREENING (DAST -1)
Retinoid Dermatitis Aggressive Treatment: I recommended more frequent application of Cetaphil or CeraVe to the areas of dermatitis. I also prescribed a topical steroid for twice daily use until the dermatitis resolves. Statement Selected

## 2024-12-11 NOTE — ED ADULT NURSE NOTE - OBJECTIVE STATEMENT
89 y.o female, A&Ox4, c/o left elbow pain. As per pt she noticed left elbow pain and swelling, and warmth, redness x 2 weeks. pt states she fell last month, and states there was a wound there but it healed. pt states swelling noted 2 weeks ago, and states has progressively gotten worse. pt states taking Tylenol with no relief. pt went to urgent care yesterday and was given clindamycin 300 mg and mupirocin 2%. Patient denies any: sob, difficulty breathing, dizziness, headache, vision changes, cp, palpations, abdominal pain, n/v/d, fever, chills, numbness, tingling, urinary symptoms, LE swelling. Respirations even and unlabored. pt ambulatory with steady gait.

## 2024-12-11 NOTE — ED PROVIDER NOTE - ATTENDING APP SHARED VISIT CONTRIBUTION OF CARE
Franchesca:  I have independently evaluated the patient and have documented in the appropriate sections above.  I agree with the exam and plan as noted above by the advanced care practitioner, Teresa. My contribution consisted of establishing the plan of care related to the chief complaint related to her left elbow swelling and dx of bursitis. Consideration of superinfected bursitis.  No tap recommended.

## 2024-12-11 NOTE — ED ADULT TRIAGE NOTE - CHIEF COMPLAINT QUOTE
Patient presents to ED c/o left elbow swelling and pain x 1 week following a fall 1 month ago. Patient was seen at urgent care yesterday and prescribed PO ABT and topical ABT.  hx HTN

## 2024-12-11 NOTE — ED PROVIDER NOTE - CLINICAL SUMMARY MEDICAL DECISION MAKING FREE TEXT BOX
LENNY Moore 11:58 - 89-year-old female with past medical history of high blood pressure, asthma on inhaler pump as needed, no blood thinners presents emergency room for left elbow swelling with mild pain for the past 2 weeks.  Reports previous fall with bruising.  Denies fever, chills, numbness, tingling.  Patient was seen at urgent care and started on unknown antibiotics.  Vital signs stable, full range of motion of elbow with significant swelling to the elbow.  Mild diffuse redness without significant increased warmth.  No evidence of abscess.  Independent x-ray read of left elbow showing significant swelling without fracture or dislocation.  Concern for bursitis, Ortho evaluated patient and does not recommended tapping at this time.  Will DC home with pain control, Ace wrap, ice and orthopedic follow-up outpatient.  Patient aware of the plan and has no questions at this time, will ANGELES

## 2024-12-11 NOTE — ED PROVIDER NOTE - PHYSICAL EXAMINATION
Sorensen:  General: No distress.  Mentation at baseline.   HEENT: WNL  Chest/Lungs: CTAB, No wheeze, No retractions, No increased work of breathing, Normal rate  Heart: S1S2 RRR, No M/R/G, Pules equal Bilaterally in upper and lower extremities distally  Abd: soft, NT/ND, No guarding, No rebound.  No hernias, no palpable masses.  Extrem: FROM in all joints, no significant edema noted, No ulcers.  Cap refil < 2sec. swollen bursa at elbow  Skin: No rash noted, warm dry.  Neuro:  Grossly normal.  No difficulty ambulating. No focal deficits.  Psychiatric: No evidence of delusions. No SI/HI.

## 2024-12-11 NOTE — ED PROVIDER NOTE - CARE PROVIDER_API CALL
Pricilla Lechuga  Orthopaedic Surgery  [address] [facility], [address] [place] [address]  Phone: [phone]  Fax: [phone]  Follow Up Time:

## 2024-12-11 NOTE — ED PROVIDER NOTE - PATIENT PORTAL LINK FT
You can access the FollowMyHealth Patient Portal offered by [facility] by registering at the following website: [email or web address]. By joining [employer]’s FollowMyHealth portal, you will also be able to view your health information using other applications (apps) compatible with our system.

## 2024-12-11 NOTE — ED PROVIDER NOTE - OBJECTIVE STATEMENT
Worsened pain and discomfort following a fall two weeks ago. Urgent care did not substantively assist the patient in managing her condition.  - History of Present Illness : Maryellen Claudio, an 89-year-old woman, presented with an exacerbation of bursitis following a fall about two weeks ago. Although she had bruised herself during the fall, she did report to have healed thereafter. However, over time she experienced discomfort originating from the site injured during the fall. Urgent care was initially sought, but they could not offer substantial assistance, only prescribing antibiotics and ointment for the patient.  - Past Medical History : The patient suffers from high blood pressure. She is on several unnamed medications for this and other conditions.

## 2024-12-11 NOTE — ED PROVIDER NOTE - NSFOLLOWUPINSTRUCTIONS_ED_ALL_ED_FT
Today you were seen in the ER for left elbow swelling which is likely attributed to bursitis.    Your x-rays were not read by the radiologist yet however were reviewed by the emergency room doctor and PA and does not show any signs of fracture.  If the official read were to be any different than what was discussed, you will receive a call from the hospital once the report is available.    Take Motrin 600 mg every 8 hours as needed for moderate pain or fevers -- take with food.    Take Tylenol 650mg (Two 325 mg pills) every 4-6 hours as needed for pain or fevers.    Elbow Bursitis    WHAT YOU NEED TO KNOW:    What is elbow bursitis? Elbow bursitis is inflammation of the bursa in your elbow. The bursa is a fluid-filled sac that acts as a cushion between a bone and a tendon. A tendon is a cord of strong tissue that connects muscles to bones. The bursa is located right under the point of your elbow.  Adult Arm Bones    What increases my risk for elbow bursitis?  An injury, such as a fall  Overuse of your elbow, such as when you play tennis, vacuum, or swing a hammer  Pressure, such as when you lean on your elbow  Bacterial infection  Medical conditions, such as rheumatoid arthritis or gout    What are the signs and symptoms of elbow bursitis?  Pain or tenderness when you touch or move your elbow  Redness or swelling on or around the point of your elbow  Decreased movement of your elbow  Warmth of the skin over your elbow    A grating or grinding sound or feeling when you move your elbow  How is elbow bursitis diagnosed? Your healthcare provider will examine your elbow and ask about your injury or activities. You may need any of the following:    Blood tests may be used to check for signs of infection. Healthcare providers may also check for diseases that may be causing your bursitis, such as rheumatoid arthritis.    X-ray or MRI pictures are used to check your elbow. You may be given contrast liquid to help your elbow show up better in the pictures. Tell the healthcare provider if you have ever had an allergic reaction to contrast liquid. Do not enter the MRI room with anything metal. Metal can cause serious injury. Tell the healthcare provider if you have any metal in or on your body.    A sample of fluid from your elbow may tested for signs of infection.  How is elbow bursitis treated?    Medicines:  NSAIDs, such as ibuprofen, help decrease swelling, pain, and fever. NSAIDs can cause stomach bleeding or kidney problems in certain people. If you take blood thinner medicine, always ask your healthcare provider if NSAIDs are safe for you. Always read the medicine label and follow directions.    Aspirin helps relieve pain and swelling. Take aspirin exactly as directed by your healthcare provider.    Antibiotics help fight an infection caused by bacteria.    Steroids help decrease pain and swelling. Steroid injections are given directly into the painful area. Steroid pills may be given for a short time to relieve acute pain.    Fluid aspiration is a procedure to remove fluid from the area. This may be done before you have a steroid injection.    Surgery may be needed to remove your bursa or part of your elbow bone. Surgery is only done when other treatments do not work.  How can I manage elbow bursitis?    Rest your elbow as much as possible to decrease pain and swelling. Slowly start to do more each day. Return to your daily activities as directed. Do not bend your elbow all the way or lift anything heavy while your elbow is healing. An occupational therapist can help you find ways to keep your elbow rested that will help with the type of work you do. For example, arm rests that do not touch the elbow can make it easier to work at a computer.    Use an elbow pad while your elbow heals. An elbow pad will cushion and protect your elbow. Your healthcare provider can tell you the kind of elbow pad to use. He or she can also tell you how long to wear it each day, and for how many days to use it.    Apply ice to help decrease swelling and pain. Use an ice pack, or put crushed ice in a plastic bag. Cover the bag with a towel before you place it on your elbow. Apply ice for 15 to 20 minutes, 3 to 4 times each day, as directed.    Use compression to help decrease swelling. Healthcare providers may wrap your arm with tape or an elastic bandage. Loosen the elastic bandage if you start to lose feeling in your fingers    Elevate (raise) your elbow above the level of your heart as often as you can. This will help decrease swelling and pain. Prop your elbow on pillows or blankets to keep it elevated comfortably.    Go to physical therapy, if directed. A physical therapist teaches you exercises to help improve movement and strength, and to decrease pain.  How can I prevent elbow bursitis?    Avoid injury and pressure to your elbows. Wear elbow pads or protectors when you play sports. Do not lean on your elbows or clench your fists. Do not tightly  small items, such as tools or pens.    Stretch, warm up, and cool down. Always stretch and do warmup and cool-down exercises before and after you exercise. This will help loosen your muscles and decrease stress on your elbows. Rest between workouts.    When should I call my doctor?  Your pain and swelling increase.  Your symptoms do not improve after 10 days of treatment.  You have a fever.  You have questions or concerns about your condition or care.    Advance activity as tolerated.      Continue all previously prescribed medications as directed unless otherwise instructed.      Follow up with your primary care physician and ortho in 48-72 hours- bring copies of your results.

## 2024-12-11 NOTE — CONSULT NOTE ADULT - SUBJECTIVE AND OBJECTIVE BOX
Orthopedics Consult Note:    This is a 89y RHD Female who presents to the ED today with c/o left elbow pain, swelling, for 2 weeks in duration. Pt states that she fell about a month ago and was doing fine until she developed L posterior elbow swelling about 2 weeks ago.  She notes some tenderness to palpation and some pain with end movement.  She states that she was seen at an urgent care yesterday and started on antibiotics. Pt denies any numbness, tingling or parethesias. Pt denies any fever or chills.    Past Medical & Surgical History:  SWOLLEN ELBOW    MEWS Score    Prior    H/O: HTN (hypertension)    Lung mass    HTN (hypertension)    Asthma    Tortuous aorta    No significant past surgical history    SWOLLEN ELBOW    90+    SysAdmin_VisitLink        Allergies:  No Known Allergies      Vital Signs:  T(C): 36.6 (12-11-24 @ 09:18), Max: 36.6 (12-11-24 @ 09:18)  HR: 75 (12-11-24 @ 09:18) (75 - 75)  BP: 128/78 (12-11-24 @ 09:18) (128/78 - 128/78)  RR: 17 (12-11-24 @ 09:18) (17 - 17)  SpO2: 100% (12-11-24 @ 09:18) (100% - 100%)    Labs:        Imaging:  X-rays of the left elbow demonstrate soft tissue swelling over olecranon.    PE left elbow:  +moderate localized swelling over olecranon with +fluctuance, skin intact, +min warmth. +TTP. minimally limited at end range flexion ROM 2' pain. Moving all fingers, sensation intact. Radial pulse 2+.    Assessment:  89y Female with left elbow olecranon bursitis, likely traumatic olecranon bursitis.    Procedure:  Incision and drainage performed at bedside in ED under sterile techique **with residents after injection of **cc of 1% lidocaine into surrounding tissues. **?cc of **blood/pus expressed. Wound cultures collected to be sent to lab. **Wound packed and dressed with xeroform, 4x4 and ACE. Pt tolerated procedure well with improved pain, moving all fingers, sensation intact, Radial pulse 2+.    Plan:  Keep dressing clean, dry and intact.  Pain control.  Ice application.  LUE elevation.  WBAT RUE**/**LUE.  **Antibiotics.   f/u Wound cultures.  Follow-up with Matthew in the office within 1 week; call for appointment.   Return to ED immediately if fevers, chills, severe pain, severe swelling, numbness/tingling or fingers changing color.     Case discussed with Matthew who agrees with plan. Orthopedics Consult Note:    This is a 89y RHD Female who presents to the ED today with c/o left elbow pain, swelling, for 2 weeks in duration. Pt states that she fell about a month ago and was doing fine until she developed L posterior elbow swelling about 2 weeks ago.  She notes some tenderness to palpation and some pain with end movement.  She states that she was seen at an urgent care yesterday and started on antibiotics. Pt denies any numbness, tingling or parethesias. Pt denies any fever or chills.    Past Medical & Surgical History:  SWOLLEN ELBOW    MEWS Score    Prior    H/O: HTN (hypertension)    Lung mass    HTN (hypertension)    Asthma    Tortuous aorta    No significant past surgical history    SWOLLEN ELBOW    90+    SysAdmin_VisitLink        Allergies:  No Known Allergies      Vital Signs:  T(C): 36.6 (12-11-24 @ 09:18), Max: 36.6 (12-11-24 @ 09:18)  HR: 75 (12-11-24 @ 09:18) (75 - 75)  BP: 128/78 (12-11-24 @ 09:18) (128/78 - 128/78)  RR: 17 (12-11-24 @ 09:18) (17 - 17)  SpO2: 100% (12-11-24 @ 09:18) (100% - 100%)    Labs:        Imaging:  X-rays of the left elbow demonstrate soft tissue swelling over olecranon.    PE left elbow:  +moderate localized swelling over olecranon with +fluctuance, skin intact, +min warmth. +TTP. minimally limited at end range flexion ROM 2' pain. Moving all fingers, sensation intact. Radial pulse 2+.    Assessment:  89y Female with left elbow olecranon bursitis, likely traumatic olecranon bursitis.    Plan:  Keep dressing clean, dry and intact.  Pain control.  Ice application.  LUE elevation.  WBAT RUE**/**LUE.  **Antibiotics.   f/u Wound cultures.  Follow-up with Matthew in the office within 1 week; call for appointment.   Return to ED immediately if fevers, chills, severe pain, severe swelling, numbness/tingling or fingers changing color.     Case discussed with Matthew who agrees with plan.

## 2024-12-23 ENCOUNTER — APPOINTMENT (OUTPATIENT)
Dept: ORTHOPEDIC SURGERY | Facility: CLINIC | Age: 88
End: 2024-12-23
Payer: MEDICARE

## 2024-12-23 VITALS — HEIGHT: 62 IN | WEIGHT: 113 LBS | BODY MASS INDEX: 20.8 KG/M2

## 2024-12-23 DIAGNOSIS — M25.522 PAIN IN LEFT ELBOW: ICD-10-CM

## 2024-12-23 DIAGNOSIS — M70.22 OLECRANON BURSITIS, LEFT ELBOW: ICD-10-CM

## 2024-12-23 PROCEDURE — 99204 OFFICE O/P NEW MOD 45 MIN: CPT

## 2024-12-23 RX ORDER — NAPROXEN SODIUM 550 MG/1
550 TABLET ORAL
Qty: 14 | Refills: 1 | Status: ACTIVE | COMMUNITY
Start: 2024-12-23 | End: 1900-01-01

## 2025-04-29 ENCOUNTER — EMERGENCY (EMERGENCY)
Facility: HOSPITAL | Age: 89
LOS: 1 days | End: 2025-04-29
Attending: STUDENT IN AN ORGANIZED HEALTH CARE EDUCATION/TRAINING PROGRAM | Admitting: STUDENT IN AN ORGANIZED HEALTH CARE EDUCATION/TRAINING PROGRAM
Payer: MEDICARE

## 2025-04-29 VITALS
HEART RATE: 90 BPM | RESPIRATION RATE: 17 BRPM | TEMPERATURE: 98 F | SYSTOLIC BLOOD PRESSURE: 157 MMHG | DIASTOLIC BLOOD PRESSURE: 74 MMHG | HEIGHT: 63 IN | OXYGEN SATURATION: 100 % | WEIGHT: 111.99 LBS

## 2025-04-29 LAB
ALBUMIN SERPL ELPH-MCNC: 3.7 G/DL — SIGNIFICANT CHANGE UP (ref 3.3–5)
ALP SERPL-CCNC: 123 U/L — HIGH (ref 40–120)
ALT FLD-CCNC: 11 U/L — SIGNIFICANT CHANGE UP (ref 4–33)
ANION GAP SERPL CALC-SCNC: 13 MMOL/L — SIGNIFICANT CHANGE UP (ref 7–14)
AST SERPL-CCNC: 23 U/L — SIGNIFICANT CHANGE UP (ref 4–32)
BASOPHILS # BLD AUTO: 0.06 K/UL — SIGNIFICANT CHANGE UP (ref 0–0.2)
BASOPHILS NFR BLD AUTO: 1 % — SIGNIFICANT CHANGE UP (ref 0–2)
BILIRUB SERPL-MCNC: 0.2 MG/DL — SIGNIFICANT CHANGE UP (ref 0.2–1.2)
BUN SERPL-MCNC: 18 MG/DL — SIGNIFICANT CHANGE UP (ref 7–23)
CALCIUM SERPL-MCNC: 8.9 MG/DL — SIGNIFICANT CHANGE UP (ref 8.4–10.5)
CHLORIDE SERPL-SCNC: 99 MMOL/L — SIGNIFICANT CHANGE UP (ref 98–107)
CO2 SERPL-SCNC: 25 MMOL/L — SIGNIFICANT CHANGE UP (ref 22–31)
CREAT SERPL-MCNC: 0.84 MG/DL — SIGNIFICANT CHANGE UP (ref 0.5–1.3)
EGFR: 66 ML/MIN/1.73M2 — SIGNIFICANT CHANGE UP
EGFR: 66 ML/MIN/1.73M2 — SIGNIFICANT CHANGE UP
EOSINOPHIL # BLD AUTO: 0.72 K/UL — HIGH (ref 0–0.5)
EOSINOPHIL NFR BLD AUTO: 11.9 % — HIGH (ref 0–6)
FLUAV AG NPH QL: SIGNIFICANT CHANGE UP
FLUBV AG NPH QL: SIGNIFICANT CHANGE UP
GLUCOSE SERPL-MCNC: 128 MG/DL — HIGH (ref 70–99)
HCT VFR BLD CALC: 37 % — SIGNIFICANT CHANGE UP (ref 34.5–45)
HGB BLD-MCNC: 12 G/DL — SIGNIFICANT CHANGE UP (ref 11.5–15.5)
IANC: 3.66 K/UL — SIGNIFICANT CHANGE UP (ref 1.8–7.4)
IMM GRANULOCYTES NFR BLD AUTO: 0.2 % — SIGNIFICANT CHANGE UP (ref 0–0.9)
LYMPHOCYTES # BLD AUTO: 1.12 K/UL — SIGNIFICANT CHANGE UP (ref 1–3.3)
LYMPHOCYTES # BLD AUTO: 18.5 % — SIGNIFICANT CHANGE UP (ref 13–44)
MCHC RBC-ENTMCNC: 27.4 PG — SIGNIFICANT CHANGE UP (ref 27–34)
MCHC RBC-ENTMCNC: 32.4 G/DL — SIGNIFICANT CHANGE UP (ref 32–36)
MCV RBC AUTO: 84.5 FL — SIGNIFICANT CHANGE UP (ref 80–100)
MONOCYTES # BLD AUTO: 0.49 K/UL — SIGNIFICANT CHANGE UP (ref 0–0.9)
MONOCYTES NFR BLD AUTO: 8.1 % — SIGNIFICANT CHANGE UP (ref 2–14)
NEUTROPHILS # BLD AUTO: 3.66 K/UL — SIGNIFICANT CHANGE UP (ref 1.8–7.4)
NEUTROPHILS NFR BLD AUTO: 60.3 % — SIGNIFICANT CHANGE UP (ref 43–77)
NRBC # BLD AUTO: 0 K/UL — SIGNIFICANT CHANGE UP (ref 0–0)
NRBC # FLD: 0 K/UL — SIGNIFICANT CHANGE UP (ref 0–0)
NRBC BLD AUTO-RTO: 0 /100 WBCS — SIGNIFICANT CHANGE UP (ref 0–0)
NT-PROBNP SERPL-SCNC: 1143 PG/ML — HIGH
PLATELET # BLD AUTO: 251 K/UL — SIGNIFICANT CHANGE UP (ref 150–400)
POTASSIUM SERPL-MCNC: 4.4 MMOL/L — SIGNIFICANT CHANGE UP (ref 3.5–5.3)
POTASSIUM SERPL-SCNC: 4.4 MMOL/L — SIGNIFICANT CHANGE UP (ref 3.5–5.3)
PROT SERPL-MCNC: 7.9 G/DL — SIGNIFICANT CHANGE UP (ref 6–8.3)
RBC # BLD: 4.38 M/UL — SIGNIFICANT CHANGE UP (ref 3.8–5.2)
RBC # FLD: 15.2 % — HIGH (ref 10.3–14.5)
RSV RNA NPH QL NAA+NON-PROBE: SIGNIFICANT CHANGE UP
SARS-COV-2 RNA SPEC QL NAA+PROBE: SIGNIFICANT CHANGE UP
SODIUM SERPL-SCNC: 137 MMOL/L — SIGNIFICANT CHANGE UP (ref 135–145)
SOURCE RESPIRATORY: SIGNIFICANT CHANGE UP
TROPONIN T, HIGH SENSITIVITY RESULT: 24 NG/L — SIGNIFICANT CHANGE UP
WBC # BLD: 6.06 K/UL — SIGNIFICANT CHANGE UP (ref 3.8–10.5)
WBC # FLD AUTO: 6.06 K/UL — SIGNIFICANT CHANGE UP (ref 3.8–10.5)

## 2025-04-29 PROCEDURE — 93010 ELECTROCARDIOGRAM REPORT: CPT

## 2025-04-29 PROCEDURE — 99285 EMERGENCY DEPT VISIT HI MDM: CPT

## 2025-04-29 PROCEDURE — 71046 X-RAY EXAM CHEST 2 VIEWS: CPT | Mod: 26

## 2025-04-29 RX ORDER — IPRATROPIUM BROMIDE AND ALBUTEROL SULFATE .5; 2.5 MG/3ML; MG/3ML
3 SOLUTION RESPIRATORY (INHALATION) ONCE
Refills: 0 | Status: DISCONTINUED | OUTPATIENT
Start: 2025-04-29 | End: 2025-04-29

## 2025-04-29 RX ORDER — IPRATROPIUM BROMIDE AND ALBUTEROL SULFATE .5; 2.5 MG/3ML; MG/3ML
3 SOLUTION RESPIRATORY (INHALATION)
Refills: 0 | Status: COMPLETED | OUTPATIENT
Start: 2025-04-29 | End: 2025-04-29

## 2025-04-29 RX ORDER — METHYLPREDNISOLONE ACETATE 80 MG/ML
40 INJECTION, SUSPENSION INTRA-ARTICULAR; INTRALESIONAL; INTRAMUSCULAR; SOFT TISSUE ONCE
Refills: 0 | Status: COMPLETED | OUTPATIENT
Start: 2025-04-29 | End: 2025-04-29

## 2025-04-29 RX ADMIN — METHYLPREDNISOLONE ACETATE 40 MILLIGRAM(S): 80 INJECTION, SUSPENSION INTRA-ARTICULAR; INTRALESIONAL; INTRAMUSCULAR; SOFT TISSUE at 19:51

## 2025-04-29 RX ADMIN — IPRATROPIUM BROMIDE AND ALBUTEROL SULFATE 3 MILLILITER(S): .5; 2.5 SOLUTION RESPIRATORY (INHALATION) at 19:51

## 2025-04-29 RX ADMIN — IPRATROPIUM BROMIDE AND ALBUTEROL SULFATE 3 MILLILITER(S): .5; 2.5 SOLUTION RESPIRATORY (INHALATION) at 20:07

## 2025-04-29 RX ADMIN — IPRATROPIUM BROMIDE AND ALBUTEROL SULFATE 3 MILLILITER(S): .5; 2.5 SOLUTION RESPIRATORY (INHALATION) at 20:08

## 2025-04-29 NOTE — ED ADULT TRIAGE NOTE - CHIEF COMPLAINT QUOTE
Pt coming to ER for shortness of breath. Sent from provider's office for further evaluation of asthma. Pt has long history of asthma. noted to have expiratory wheezing at this time. However not noted to have use of accessory muscles or labored breathing noted. Noted to have LBBB on EKG from providers office.

## 2025-04-29 NOTE — ED ADULT NURSE NOTE - MODE OF DISCHARGE
Patient was prescribed Oxycodone for post-op pain management, states he can not tolerate as it is too strong. He has been getting by with Tylenol during the day, however at night this doesn't seem to help, and so he is requesting Hydrocodone. Please call to advise. Ambulatory

## 2025-04-29 NOTE — ED PROVIDER NOTE - PATIENT PORTAL LINK FT
You can access the FollowMyHealth Patient Portal offered by [facility] by registering at the following website: [email or web address]. By joining [employer]’s FollowMyHealth portal, you will also be able to view your health information using other applications (apps) compatible with our system.

## 2025-04-29 NOTE — ED ADULT NURSE REASSESSMENT NOTE - NS ED NURSE REASSESS COMMENT FT1
Report received from ISRAEL Cohen. Pt resting in stretcher, A&O x 4, reports partial relief of SOB, 3rd nebulizer treatment ongoing. NSR on monitor, safety maintained, comfort provided, care plan ongoing.

## 2025-04-29 NOTE — ED ADULT NURSE NOTE - OBJECTIVE STATEMENT
A&Ox4. Past medical history asthma and HTN. Presents to the ED for shortness of breath. Sent from provider's office for further evaluation of asthma. Pt has long history of asthma. Noted to have expiratory wheezing at this time. However not noted to have use of accessory muscles or labored breathing noted . PCP also noted abnormal EKG but Pt unaware of abnormality. Denies recent travel or sick contact. Denies CP, dizziness, or N/V/D. Respirations even and labored. 20 gauge IV placed in right forearm. Labs obtained. Awaiting diagnostic testing. Safety precautions in place.

## 2025-04-29 NOTE — ED PROVIDER NOTE - WR ORDER ID 1
ASSUMED CARE OF PT AT 0730. PT LYING IN BED. A&0X4, FORGETFUL AT TIMES. PT
DENIES ANY PAIN OR SHORTNESS OF BREATH. PT STATES SHE HAD 2 SEVERE DIZZY
SPELLS EARLY THIS AM. PT COMPLAINS OF DIZZINESS AT REST AND WITH EXERTION.
TRACING AV PACED ON THE CARDIAC MONITOR. ON RA SAT 96%. PT UP WITH 1 ASSIST TO
BATHROOM-UNSTEADY AND WOBBLY AT TIMES. DR WELLS HERE TO SEE PT. SCOPOLAMINE
PATCH AND MECLIZINE DISCONTINUED. REFER TO EMAR. NEURO CONSULT IN PLACE. PT
GOAL FOR TODAY IS WORK WITH PHYSICAL THERAPY, REMAIN FREE FROM FALLS AND
INCREASE ACTIVITY. AM ASSESSMENT AS CHARTED. MEDICATIONS PER MAR. PT
REPOSITIONS SELF. HOURLY ROUNDING OBSERVED. BED IN LOW POSITION. CALL LIGHT
WITHIN REACH. FALL PRECAUTIONS IN PLACE. WILL CONTINUE PLAN OF CARE. [id number]

## 2025-04-29 NOTE — ED ADULT NURSE NOTE - NSFALLUNIVINTERV_ED_ALL_ED
Bed/Stretcher in lowest position, wheels locked, appropriate side rails in place/Call bell, personal items and telephone in reach/Instruct patient to call for assistance before getting out of bed/chair/stretcher/Non-slip footwear applied when patient is off stretcher/[employer] to call system/Physically safe environment - no spills, clutter or unnecessary equipment/Purposeful proactive rounding/Room/bathroom lighting operational, light cord in reach

## 2025-04-29 NOTE — ED PROVIDER NOTE - NSFOLLOWUPINSTRUCTIONS_ED_ALL_ED_FT
Shortness of breath    Shortness of breath (dyspnea) means you have trouble breathing and could indicate a medical problem. Causes include lung disease, heart disease, low amount of red blood cells (anemia), poor physical fitness, being overweight, smoking, etc. Your health care provider today may not be able to find a cause for your shortness of breath after your exam. In this case, it is important to have a follow-up exam with your primary care physician as instructed. If medicines were prescribed, take them as directed for the full length of time directed. Refrain from tobacco products.    SEEK IMMEDIATE MEDICAL CARE IF YOU HAVE ANY OF THE FOLLOWING SYMPTOMS: worsening shortness of breath, chest pain, back pain, abdominal pain, fever, coughing up blood, lightheadedness/dizziness.      You were seen today for shortness of breath, likely due to asthma exacerbation.    You have multiple pulmonary/lung nodules seen on chest CAT scan today.  It is recommended that you repeat the CAT scan within the next 6 to 8 weeks.    It was recommended that you stay in the hospital for further observation and treatment but you declined admission.      You have signed out AGAINST MEDICAL ADVICE.    Please return to the emergency department immediately if you have new or worsening symptoms including:  – New or worsening shortness of breath  – Chest pain  – Fevers  – Passing out  – Confusion    Please follow-up with your PCP within next 1 to 2 days.

## 2025-04-29 NOTE — ED PROVIDER NOTE - CLINICAL SUMMARY MEDICAL DECISION MAKING FREE TEXT BOX
Ms. Claudio is a 89-year-old female, history of asthma, presenting to the emergency department for shortness of breath and wheezing.  The patient states that she has asthma and this feels very characteristic of her symptoms when she usually has a flare.  The patient states for the past 10 days she has had the shortness of breath with wheezing.  The patient denies any chest pain with her symptoms, fevers or chills.  The patient does endorse a cough as well.  The patient denies any recent sick contacts.  ROS is otherwise negative.    VSS.  PE.  Bilateral expiratory wheezing heard on lung examination, patient in no acute distress, in no respiratory distress.  Patient comfortable on examination.  Able to complete sentences.  Cardiac examination with no rubs murmurs or gallops.  Patient with moist mucous membranes.    Differential includes but is not limited to acute asthma exacerbation, pneumonia, pleurisy, viral URI.  Will obtain basic labs, chest x-ray, and treat patient for asthma exacerbation.  Final dispo pending reassessment.

## 2025-04-29 NOTE — ED PROVIDER NOTE - PROGRESS NOTE DETAILS
Arely Chao, PGY-3 DO:  Patient occasionally desaturating to the low 90s high 80s. CTA ordered for further evaluation. Patient will likely need to be admitted. Pending CTA results. 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  Patient informed of findings along with son at bedside who states that they were aware of previous descending thoracic aneurysm.  He states that it was previously 5 cm and they have been following with surgeons but patient declining any intervention.  Patient also found to have bilateral pulmonary nodules, informed of findings.  Patient reassessed and resting comfortably, no acute respiratory distress.  Patient with bilateral expiratory wheezes in bilateral lung bases.  Ambulatory sat with O2 sat ranging from 94 to 99% on room air.  Recommendation for admission due to continued wheezing and previous desats.  Extensive discussion with patient regarding benefits of admission.  Patient refusing admission at this time, patient AO x 4, understands risks of leaving at this time.  Patient able to fully verbalize risks of leaving AGAINST MEDICAL ADVICE including worsening of condition and death.  Son at bedside attempting to convince patient to stay but unable to convince her.  Will AMA patient at this time.  DC instructions and strict return precautions discussed with patient.  Questions answered.

## 2025-04-30 VITALS
DIASTOLIC BLOOD PRESSURE: 84 MMHG | HEART RATE: 74 BPM | OXYGEN SATURATION: 95 % | TEMPERATURE: 98 F | SYSTOLIC BLOOD PRESSURE: 142 MMHG | RESPIRATION RATE: 18 BRPM

## 2025-04-30 LAB — TROPONIN T, HIGH SENSITIVITY RESULT: 19 NG/L — SIGNIFICANT CHANGE UP

## 2025-04-30 PROCEDURE — 71275 CT ANGIOGRAPHY CHEST: CPT | Mod: 26

## 2025-04-30 NOTE — ED ADULT NURSE REASSESSMENT NOTE - NS ED NURSE REASSESS COMMENT FT1
Pt A&O x 4, offers no complaints of pain, discomfort, SOB at this time, ambulatory sat  competed with O2 between 94-98% on room air, MD Sales made aware, safety maintained, comfort provided, pt to AMA at this time.

## 2025-04-30 NOTE — ED ADULT NURSE REASSESSMENT NOTE - NS ED NURSE REASSESS COMMENT FT1
Break covering RN: patient received, appears to be resting comfortably in bed, no complaints noted at this time. Breathing even and unlabored, pallor/diaphoresis not noted. waiting for CT chest .will continue to monitor.